# Patient Record
Sex: MALE | Race: WHITE | Employment: OTHER | URBAN - METROPOLITAN AREA
[De-identification: names, ages, dates, MRNs, and addresses within clinical notes are randomized per-mention and may not be internally consistent; named-entity substitution may affect disease eponyms.]

---

## 2017-01-03 ENCOUNTER — TELEPHONE (OUTPATIENT)
Dept: NURSING | Facility: CLINIC | Age: 37
End: 2017-01-03

## 2017-01-03 NOTE — TELEPHONE ENCOUNTER
"Call Type: Triage Call    Presenting Problem: \"I have blood in my stool.\" Pt. says that  yesterday, he had blood in his stool 1 time. Today, pt. has had  blood in his stool 5 times. Pt. says that when he wiped, after the  BM today, the toilet paper was saturated with the blood. Pt. says  that his stools are pasty and light brown-colored.  Triage Note:  Guideline Title: Gastrointestinal Bleeding ; Gastrointestinal Bleeding  Recommended Disposition: See Provider within 4 hours  Original Inclination: Wanted to speak with a nurse  Override Disposition: See ED Immediately  Intended Action: Go to Hospital / ED  Physician Contacted: No  One or more episodes of rectal bleeding (more than scant) and no symptoms of  hypovolemia ?  YES  New or worsening signs and symptoms that may indicate shock ? NO  Vomited blood after nosebleed ? NO  Receiving or recently completed chemotherapy or radiation therapy AND more than  one episode of black or tarry stool, not blood streaked ? NO  Rectal foreign body with any amount of bright red bleeding ? NO  Following ingestion of toxic or caustic substance ? NO  Passing red, black or tarry material from rectum AND onset of new signs and  symptoms of hypovolemia ? NO  Unbearable abdominal/pelvic pain ? NO  Vomiting red, bloody or coffee-ground material, more than streaks of blood or  scant amount (not following nosebleed within past day) ? NO  Chest discomfort associated with shortness of breath, sweating, odd heartbeats or  different heart rate, nausea, vomiting, lightheadedness, or fainting lasting 5 or  more minutes now or within the last hour ? NO  Chest pain spreading to the shoulders, neck, jaw, in one or both arms, stomach or  back lasting 5 or more minutes now or within the last hour. Pain is NOT  associated with taking a deep breath or a productive cough, movement, or touch to  a localized area. ? NO  Pressure, fullness, squeezing sensation or pain anywhere in the chest lasting 5 " or  more minutes now or within the last hour. Pain is NOT associated with taking a  deep breath or a productive cough, movement, or touch to a localized area on the  chest. ? NO  Bloody diarrhea AND has not been evaluated ? NO  Bleeding began after any gastrointestinal (GI) surgery or procedure and is lasting  longer than defined in provider specified discharge information ? NO  History of esophageal varices AND more than one episode of black or tarry stool ?  NO  Eight hours or less following abdominal or rectal injury AND having any amount  bright red rectal bleeding ? NO  Physician Instructions:  Care Advice: Call  if signs and symptoms of shock develop (such as  unable to stand due to faintness, dizziness, or lightheadedness  new onset of confusion  slow to respond or difficult to awaken  skin is pale, gray, cool, or moist to touch  severe weakness  loss of consciousness).  IMMEDIATE ACTION  CAUTIONS  Call provider immediately if symptoms of hypovolemia develop including:  pulse more than 100/minute, lightheaded or dizzy when rising from  sitting/reclining position, shortness of breath, weakness with exertion,  angina, or paleness.  List, or take, all current prescription(s), nonprescription or alternative  medication(s) to provider for evaluation.  May have clear liquids (such as water, clear fruit juices without pulp,  soda, tea or coffee without dairy or non-dairy creamer, clear broth or  bouillon, oral hydration solution, or plain gelatin, fruit ices/popsicles,  hard candy) but do not eat solid foods before talking with your provider.

## 2017-03-13 DIAGNOSIS — Z20.828 EXPOSURE TO INFLUENZA: Primary | ICD-10-CM

## 2017-03-13 RX ORDER — OSELTAMIVIR PHOSPHATE 75 MG/1
75 CAPSULE ORAL DAILY
Qty: 10 CAPSULE | Refills: 0 | Status: SHIPPED | OUTPATIENT
Start: 2017-03-13 | End: 2017-03-23

## 2017-03-24 DIAGNOSIS — F51.01 PRIMARY INSOMNIA: ICD-10-CM

## 2017-03-24 RX ORDER — ZOLPIDEM TARTRATE 10 MG/1
TABLET ORAL
Qty: 30 TABLET | Refills: 5 | Status: SHIPPED | OUTPATIENT
Start: 2017-03-24 | End: 2017-08-11

## 2017-03-24 NOTE — TELEPHONE ENCOUNTER
Zolpidem      Last Written Prescription Date:  11/27/16  Last Fill Quantity: 30,   # refills: 2  Last Office Visit with [facility], [facility] or [facility] prescribing provider: 05/16/16 Dr. Gutierrez    Future Office visit:       Routing refill request to provider for review/approval because:  Drug not on the [facility], [facility] or [facility] refill protocol or controlled substance    FABIAN Johnson)

## 2017-04-05 ENCOUNTER — TELEPHONE (OUTPATIENT)
Dept: FAMILY MEDICINE | Facility: CLINIC | Age: 37
End: 2017-04-05

## 2017-04-05 DIAGNOSIS — F51.01 PRIMARY INSOMNIA: ICD-10-CM

## 2017-04-05 RX ORDER — ZOLPIDEM TARTRATE 10 MG/1
TABLET ORAL
Qty: 30 TABLET | Refills: 5 | Status: CANCELLED | OUTPATIENT
Start: 2017-04-05

## 2017-04-05 NOTE — TELEPHONE ENCOUNTER
Reason for Call:  Medication or medication refill:    Do you use a [facility]?  Name of the pharmacy and phone number for the current request:  No ,  [facility] [address] [place] [facility], [place] - [address]    Name of the medication requested: zolpidem (AMBIEN) 10 MG tablet    Other request: Insurance only authorizes 15 tablets per 25 days , needs prior auth for 1 tablet per day. Pharmacy to fax over Prior Auth form.    Can we leave a detailed message on this number? YES    Phone number patient can be reached at: Other phone number:  [phone]  [facility]  Pharmacy     Best Time: Any    Call taken on 4/5/2017 at 4:09 PM by Bernie Hannah

## 2017-04-14 NOTE — TELEPHONE ENCOUNTER
Received PA form requesting more information - placed on Dr. Gutierrez's desk for review    Will Felisa HEARN

## 2017-04-21 DIAGNOSIS — F51.01 PRIMARY INSOMNIA: ICD-10-CM

## 2017-04-21 NOTE — TELEPHONE ENCOUNTER
traZODone (DESYREL) 100 MG tablet  Last Written Prescription Date: 5/16/16  Last Fill Quantity: 90; # refills: 3  Last Office Visit with [facility], [facility] or [facility] prescribing provider:  5/16/16 Dr. Gutierrez          Last PHQ-9 score on record=   PHQ-9 SCORE 1/21/2013   Total Score 5       Lab Results   Component Value Date    AST 28 04/27/2015     Lab Results   Component Value Date    ALT 30 04/27/2015

## 2017-04-25 RX ORDER — TRAZODONE HYDROCHLORIDE 100 MG/1
100 TABLET ORAL
Qty: 30 TABLET | Refills: 0 | Status: SHIPPED | OUTPATIENT
Start: 2017-04-25 | End: 2017-06-14

## 2017-06-14 DIAGNOSIS — F51.01 PRIMARY INSOMNIA: ICD-10-CM

## 2017-06-14 RX ORDER — TRAZODONE HYDROCHLORIDE 100 MG/1
100 TABLET ORAL
Qty: 30 TABLET | Refills: 0 | Status: SHIPPED | OUTPATIENT
Start: 2017-06-14 | End: 2017-08-11

## 2017-06-14 NOTE — TELEPHONE ENCOUNTER
traZODone (DESYREL) 100 MG tablet       Last Written Prescription Date: 4/5/17  Last Fill Quantity: 30; # refills: 0  Last Office Visit with [facility], [facility] or [facility] prescribing provider:  5/16/16 Dr. Gutierrez          Last PHQ-9 score on record=   PHQ-9 SCORE 1/21/2013   Total Score 5       Lab Results   Component Value Date    AST 28 04/27/2015     Lab Results   Component Value Date    ALT 30 04/27/2015

## 2017-06-14 NOTE — TELEPHONE ENCOUNTER
Routing refill request to provider for review/approval because:  PHQ-9 over 4  Kay Tomlinson RN

## 2017-07-13 DIAGNOSIS — F51.01 PRIMARY INSOMNIA: ICD-10-CM

## 2017-07-13 NOTE — TELEPHONE ENCOUNTER
traZODone (DESYREL) 100 MG tablet       Last Written Prescription Date: 6/14/17  Last Fill Quantity: 30; # refills: 0  Last Office Visit with [facility], [facility] or [facility] prescribing provider:  5/16/17        Last PHQ-9 score on record=   PHQ-9 SCORE 1/21/2013   Total Score 5       Lab Results   Component Value Date    AST 28 04/27/2015     Lab Results   Component Value Date    ALT 30 04/27/2015

## 2017-07-14 RX ORDER — TRAZODONE HYDROCHLORIDE 100 MG/1
100 TABLET ORAL
Qty: 30 TABLET | Refills: 0 | OUTPATIENT
Start: 2017-07-14

## 2017-07-14 NOTE — TELEPHONE ENCOUNTER
Routing refill request to provider for review/approval because:  Aparna given x1 and patient did not follow up, please advise    CARLOS Ugarte, Clinical RN Renetta Molina.

## 2017-08-08 DIAGNOSIS — F51.01 PRIMARY INSOMNIA: ICD-10-CM

## 2017-08-08 RX ORDER — TRAZODONE HYDROCHLORIDE 100 MG/1
100 TABLET ORAL
Qty: 30 TABLET | Refills: 0 | Status: CANCELLED | OUTPATIENT
Start: 2017-08-08

## 2017-08-08 NOTE — TELEPHONE ENCOUNTER
traZODone (DESYREL) 100 MG tablet       Last Written Prescription Date: 06/14/17  Last Fill Quantity: 30; # refills: 0  Last Office Visit with [facility], [facility] or [facility] prescribing provider:  05/16/16 Dr. Gutierrez        Last PHQ-9 score on record=   PHQ-9 SCORE 1/21/2013   Total Score 5       Lab Results   Component Value Date    AST 28 04/27/2015     Lab Results   Component Value Date    ALT 30 04/27/2015

## 2017-08-11 ENCOUNTER — OFFICE VISIT (OUTPATIENT)
Dept: FAMILY MEDICINE | Facility: CLINIC | Age: 37
End: 2017-08-11
Payer: COMMERCIAL

## 2017-08-11 VITALS
HEART RATE: 80 BPM | SYSTOLIC BLOOD PRESSURE: 127 MMHG | OXYGEN SATURATION: 96 % | DIASTOLIC BLOOD PRESSURE: 88 MMHG | BODY MASS INDEX: 29.7 KG/M2 | TEMPERATURE: 98.7 F | WEIGHT: 207 LBS

## 2017-08-11 DIAGNOSIS — F43.23 ADJUSTMENT DISORDER WITH MIXED ANXIETY AND DEPRESSED MOOD: ICD-10-CM

## 2017-08-11 DIAGNOSIS — R11.2 NAUSEA AND VOMITING, INTRACTABILITY OF VOMITING NOT SPECIFIED, UNSPECIFIED VOMITING TYPE: ICD-10-CM

## 2017-08-11 DIAGNOSIS — F51.01 PRIMARY INSOMNIA: Primary | ICD-10-CM

## 2017-08-11 PROCEDURE — 99213 OFFICE O/P EST LOW 20 MIN: CPT | Performed by: PHYSICIAN ASSISTANT

## 2017-08-11 RX ORDER — ONDANSETRON 4 MG/1
4 TABLET, FILM COATED ORAL EVERY 8 HOURS PRN
Qty: 15 TABLET | Refills: 0 | Status: SHIPPED | OUTPATIENT
Start: 2017-08-11 | End: 2018-02-19

## 2017-08-11 RX ORDER — TRAZODONE HYDROCHLORIDE 100 MG/1
100 TABLET ORAL
Qty: 30 TABLET | Refills: 1 | Status: SHIPPED | OUTPATIENT
Start: 2017-08-11 | End: 2018-02-19

## 2017-08-11 RX ORDER — ZOLPIDEM TARTRATE 10 MG/1
TABLET ORAL
Qty: 30 TABLET | Refills: 3 | Status: SHIPPED | OUTPATIENT
Start: 2017-08-11 | End: 2017-12-18

## 2017-08-11 NOTE — PATIENT INSTRUCTIONS
Based on your medical history and these are the current health maintenance or preventive care services that you are due for (some may have been done at this visit)  Health Maintenance Due   Topic Date Due     TETANUS IMMUNIZATION (SYSTEM ASSIGNED)  09/04/2006     LIPID SCREEN Q5 YR MALE (SYSTEM ASSIGNED)  10/22/2016       At [facility], we strive to deliver an exceptional experience to you, every time we see you.    If you receive a survey in the mail, please send us back your thoughts. We really do value your feedback.    Your care team's suggested websites for health information:  Www.Sustainatopia.com.org : Up to date and easily searchable information on multiple topics.  Www.medlineplus.gov : medication info, interactive tutorials, watch real surgeries online  Www.familydoctor.org : good info from the Academy of Family Physicians  Www.cdc.gov : public health info, travel advisories, epidemics (H1N1)  Www.aap.org : children's health info, normal development, vaccinations  Www.health.[facility].[place].us : [place] dept of health, public health issues in [place], N1N1    How to contact your care team:   Urgent Care/Same Day [phone]         Pharmacy [phone]      Clinic hours  M-Th 7 am-7 pm   Fri 7 am-5 pm.   Urgent care M-F 11 am-9 pm,   Sat/Sun 9 am-5 pm.  Pharmacy M-Th 8 am-8 pm Fri 8 am-6 pm  Sat/Sun 9 am-5 pm.     All password changes, disabled accounts, or ID changes in [employer]/MyHealth will be done by our Access Services Department.    If you need help with your account or password, call: [phone]. Clinic staff no longer has the ability to change passwords.     Adjustment Disorder  Life changes -- work, family, parents, children -- each can cause a great deal of stress in life. An adjustment disorder means you have trouble dealing with this change and stress. This problem can have serious results. You may feel helpless, depressed, make bad decisions, or even feel like you want to hurt  yourself.  Adjustment disorder can cause anxiety or depression. It is triggered by daily stresses such as:    Death of a loved one    Divorce    Marriage    General life changes such as changing or leaving a job    Moving    Illness or other health issue for you or a family  member    Sex    Money     Symptoms may include:    Sadness, crying    Anxiety    Insomnia    Poor concentration    Trouble doing simple things    New problems at work or with family or friends    Loss of self-esteem    Sense of hopelessness    Feeling trapped or cut off from others  With this condition, it is common to feel sad, guilty, hopeless and restless. These feelings may continue for weeks or months. It can be helpful to identify what is causing the additional stress and take steps to get extra support. If new stressful events do not occur, it is likely that you will gradually start feeling better.  Home care    If you have been given a prescription for medicine, take it as directed.    It helps to talk about your feelings and thoughts with family or friends that understand and support you.  Follow-up care  Follow up with your healthcare provider, or therapist as advised. Let them know if this condition does not improve or gets worse.  When to seek medical advice  Call your healthcare provider right away if any of these occur:    Worsening depression or anxiety    Feeling out of control    Thoughts of harming yourself or another    Being unable to care for yourself  Date Last Reviewed: 9/29/2015 2000-2017 [employer]. [address] [place] [address], [place] [address]. All rights reserved. This information is not intended as a substitute for professional medical care. Always follow your healthcare professional's instructions.

## 2017-08-11 NOTE — LETTER
[address] [address]  Phone: [phone]    August 11, 2017        Francis Sommers  [address] [facility] [address]      If I am having increasing thoughts of hurting myself or feeling increasingly unsafe, I will contact a family member, [facility]  or the Suicide Prevention Hotline .             RUKHSANA Fitzgerald

## 2017-08-11 NOTE — NURSING NOTE
"Chief Complaint   Patient presents with     Depression     Pt states that he started feeling this way 3 days ago because of stress. Pt had depression in the past and was on medication       Initial /88 (BP Location: Left arm, Patient Position: Chair, Cuff Size: Adult Regular)  Pulse 80  Temp 98.7  F (37.1  C) (Oral)  Wt 207 lb (93.9 kg)  SpO2 96%  BMI 29.7 kg/m2 Estimated body mass index is 29.7 kg/(m^2) as calculated from the following:    Height as of 5/16/16: 5' 10\" (1.778 m).    Weight as of this encounter: 207 lb (93.9 kg).  Medication Reconciliation: complete   Elizabeth Chapa MA        "

## 2017-08-11 NOTE — MR AVS SNAPSHOT
After Visit Summary   8/11/2017    Francis Sommers    MRN: [id number]           Patient Information     Date Of Birth          1980        Visit Information        Provider Department      8/11/2017 10:20 AM Nick Cooney PA [facility]        Today's Diagnoses     Primary insomnia    -  1    Adjustment disorder with mixed anxiety and depressed mood        Nausea and vomiting, intractability of vomiting not specified, unspecified vomiting type          Care Instructions    Based on your medical history and these are the current health maintenance or preventive care services that you are due for (some may have been done at this visit)  Health Maintenance Due   Topic Date Due     TETANUS IMMUNIZATION (SYSTEM ASSIGNED)  09/04/2006     LIPID SCREEN Q5 YR MALE (SYSTEM ASSIGNED)  10/22/2016       At [facility], we strive to deliver an exceptional experience to you, every time we see you.    If you receive a survey in the mail, please send us back your thoughts. We really do value your feedback.    Your care team's suggested websites for health information:  Www.Futurederm.org : Up to date and easily searchable information on multiple topics.  Www.medlineplus.gov : medication info, interactive tutorials, watch real surgeries online  Www.familydoctor.org : good info from the Academy of Family Physicians  Www.cdc.gov : public health info, travel advisories, epidemics (H1N1)  Www.aap.org : children's health info, normal development, vaccinations  Www.health.state.[place].us : [place] dept of health, public health issues in [place], N1N1    How to contact your care team:   Urgent Care/Same Day [phone]         Pharmacy [phone]      Clinic hours  M-Th 7 am-7 pm   Fri 7 am-5 pm.   Urgent care M-F 11 am-9 pm,   Sat/Sun 9 am-5 pm.  Pharmacy M-Th 8 am-8 pm Fri 8 am-6 pm  Sat/Sun 9 am-5 pm.     All password changes, disabled accounts, or ID changes in  [employer]/MyHealth will be done by our Access Services Department.    If you need help with your account or password, call: [phone]. Clinic staff no longer has the ability to change passwords.     Adjustment Disorder  Life changes -- work, family, parents, children -- each can cause a great deal of stress in life. An adjustment disorder means you have trouble dealing with this change and stress. This problem can have serious results. You may feel helpless, depressed, make bad decisions, or even feel like you want to hurt yourself.  Adjustment disorder can cause anxiety or depression. It is triggered by daily stresses such as:    Death of a loved one    Divorce    Marriage    General life changes such as changing or leaving a job    Moving    Illness or other health issue for you or a family  member    Sex    Money     Symptoms may include:    Sadness, crying    Anxiety    Insomnia    Poor concentration    Trouble doing simple things    New problems at work or with family or friends    Loss of self-esteem    Sense of hopelessness    Feeling trapped or cut off from others  With this condition, it is common to feel sad, guilty, hopeless and restless. These feelings may continue for weeks or months. It can be helpful to identify what is causing the additional stress and take steps to get extra support. If new stressful events do not occur, it is likely that you will gradually start feeling better.  Home care    If you have been given a prescription for medicine, take it as directed.    It helps to talk about your feelings and thoughts with family or friends that understand and support you.  Follow-up care  Follow up with your healthcare provider, or therapist as advised. Let them know if this condition does not improve or gets worse.  When to seek medical advice  Call your healthcare provider right away if any of these occur:    Worsening depression or anxiety    Feeling out of control    Thoughts of harming  yourself or another    Being unable to care for yourself  Date Last Reviewed: 9/29/2015 2000-2017 [employer]. [address] [place] [address], [place] [address]. All rights reserved. This information is not intended as a substitute for professional medical care. Always follow your healthcare professional's instructions.                Follow-ups after your visit        Additional Services     MENTAL HEALTH REFERRAL       Your provider has referred you to: [facility]: [facility] - Counseling (Individual/Couples/Family) - [facility] [phone]   [email or web address]/   *Patient will be contacted by [facility] scheduling partner, [facility]), to schedule an appointment.  Patients may also call [facility] to schedule.    All scheduling is subject to the client's specific insurance plan & benefits, provider/location availability, and provider clinical specialities.  Please arrive 15 minutes early for your first appointment and bring your completed paperwork.    Please be aware that coverage of these services is subject to the terms and limitations of your health insurance plan.  Call member services at your health plan with any benefit or coverage questions.                  Follow-up notes from your care team     Return if symptoms worsen or fail to improve.      Who to contact     If you have questions or need follow up information about today's clinic visit or your schedule please contact [facility] directly at [phone].  Normal or non-critical lab and imaging results will be communicated to you by MyChart, letter or phone within 4 business days after the clinic has received the results. If you do not hear from us within 7 days, please contact the clinic through MyChart or phone. If you have a critical or abnormal lab result, we will notify you by phone as soon as possible.  Submit  "refill requests through [employer] or call your pharmacy and they will forward the refill request to us. Please allow 3 business days for your refill to be completed.          Additional Information About Your Visit        [employer] Information     [employer] lets you send messages to your doctor, view your test results, renew your prescriptions, schedule appointments and more. To sign up, go to [email or web address] . Click on \"Log in\" on the left side of the screen, which will take you to the Welcome page. Then click on \"Sign up Now\" on the right side of the page.     You will be asked to enter the access code listed below, as well as some personal information. Please follow the directions to create your username and password.     Your access code is: [email or web address]  Expires: 2017 10:55 AM     Your access code will  in 90 days. If you need help or a new code, please call your [place] clinic or [phone].        Care EveryWhere ID     This is your Care EveryWhere ID. This could be used by other organizations to access your [place] medical records  [id number]        Your Vitals Were     Pulse Temperature Pulse Oximetry BMI (Body Mass Index)          80 98.7  F (37.1  C) (Oral) 96% 29.7 kg/m2         Blood Pressure from Last 3 Encounters:   17 127/88   16 108/74   05/29/15 111/77    Weight from Last 3 Encounters:   17 207 lb (93.9 kg)   16 187 lb 14.4 oz (85.2 kg)   05/29/15 194 lb 14.4 oz (88.4 kg)              We Performed the Following     MENTAL HEALTH REFERRAL          Today's Medication Changes          These changes are accurate as of: 17 10:55 AM.  If you have any questions, ask your nurse or doctor.               Start taking these medicines.        Dose/Directions    FLUoxetine 20 MG capsule   Commonly known as:  PROzac   Used for:  Adjustment disorder with mixed anxiety and depressed mood   Started by:  Nick Cooney PA        Dose:  20 mg   Take 1 " capsule (20 mg) by mouth daily   Quantity:  30 capsule   Refills:  2       ondansetron 4 MG tablet   Commonly known as:  ZOFRAN   Used for:  Nausea and vomiting, intractability of vomiting not specified, unspecified vomiting type   Started by:  Nick Cooney PA        Dose:  4 mg   Take 1 tablet (4 mg) by mouth every 8 hours as needed for nausea   Quantity:  15 tablet   Refills:  0            Where to get your medicines      These medications were sent [place] [address] [place] [facility] [place] - [address] [facility]  [address] [facility] [address] [facility] [address]     Phone:  [phone]     FLUoxetine 20 MG capsule    ondansetron 4 MG tablet    traZODone 100 MG tablet         Some of these will need a paper prescription and others can be bought over the counter.  Ask your nurse if you have questions.     Bring a paper prescription for each of these medications     zolpidem 10 MG tablet                Primary Care Provider Office Phone # Fax #    Candy Silvano Gutierrez [phone] [address] [facility] [address]        Equal Access to Services     [facility]: Hadii aad ku hadasho Soomaali, waaxda luqadaha, qaybta kaalmada adeegyada, waxay fred hoskins . So [facility] [place] [phone].    ATENCIÓN: Si habla español, tiene a schaefer disposición servicios gratuitos de asistencia lingüística. Llame al [phone].    We comply with applicable federal civil rights laws and [place] laws. We do not discriminate on the basis of race, color, national origin, age, disability sex, sexual orientation or gender identity.            Thank you!     Thank you for choosing [facility]  for your care. Our goal is always to provide you with excellent care. Hearing back from our patients is one way we can continue to improve our services. Please take a few minutes to complete the written survey that you may receive in the mail after your visit with us. Thank you!              Your Updated Medication List - Protect others around you: Learn how to safely use, store and throw away your medicines at www.disposemymeds.org.          This list is accurate as of: 8/11/17 10:55 AM.  Always use your most recent med list.                   Brand Name Dispense Instructions for use Diagnosis    FLUoxetine 20 MG capsule    PROzac    30 capsule    Take 1 capsule (20 mg) by mouth daily    Adjustment disorder with mixed anxiety and depressed mood       ondansetron 4 MG tablet    ZOFRAN    15 tablet    Take 1 tablet (4 mg) by mouth every 8 hours as needed for nausea    Nausea and vomiting, intractability of vomiting not specified, unspecified vomiting type       traZODone 100 MG tablet    DESYREL    30 tablet    Take 1 tablet (100 mg) by mouth nightly as needed    Primary insomnia       zolpidem 10 MG tablet    AMBIEN    30 tablet    TAKE 1 TABLET BY MOUTH NIGHTLY AS NEEDED    Primary insomnia

## 2017-08-11 NOTE — PROGRESS NOTES
SUBJECTIVE:                                                    Francis Sommers is a 36 year old male who presents to clinic today for the following health issues:      Abnormal Mood Symptoms  Onset: 3 days     Description:   Depression: YES  Anxiety: YES    Accompanying Signs & Symptoms:  Still participating in activities that you used to enjoy: no  Fatigue: YES  Irritability: YES  Difficulty concentrating: YES  Changes in appetite: YES  Problems with sleep: YES  Heart racing/beating fast : YES  Thoughts of hurting yourself or others: has been having thoughts to the effect that if her were dead, there would be no stress, but he has no plan, states he doesn't actually want to die, has been reaching out to people, feels safe at home, and clearly wants help.  Family is present during visit and is supportive and able to stay with patient.  There are no firearms at home.      History:   Recent stress: YES, acute housing issues  Prior depression hospitalization: None  Family history of depression: YES  Family history of anxiety: YES    Precipitating factors:   Alcohol/drug use: no    Alleviating factors:  none    Therapies Tried and outcome: None     Hx insomnia-has been off the trazodone for a couple weeks. Almost out of ambien.    Has had similar episodes in the past, after dadas death and during the holidays    Has been in grief counseling and been on prozac in the past.      Has been feeling nausea and some vomting, is keeping down fluids, no diarrhea              No Known Allergies    Past Medical History:   Diagnosis Date     Insomnia 2/15/2011         Current Outpatient Prescriptions on File Prior to Visit:  [DISCONTINUED] traZODone (DESYREL) 100 MG tablet Take 1 tablet (100 mg) by mouth nightly as needed     No current facility-administered medications on file prior to visit.     Social History   Substance Use Topics     Smoking status: Never Smoker     Smokeless tobacco: Never Used     Alcohol use No        ROS:   GEN no fever  PSYCH depression as above  ABD n/v as above    OBJECTIVE:  /88 (BP Location: Left arm, Patient Position: Chair, Cuff Size: Adult Regular)  Pulse 80  Temp 98.7  F (37.1  C) (Oral)  Wt 207 lb (93.9 kg)  SpO2 96%  BMI 29.7 kg/m2   General:   awake, alert, and cooperative.  NAD.   Head: Normocephalic, atraumatic.  Eyes: Conjunctiva clear,    Neuro: Alert and oriented - normal speech.  PSYCH; teary, good insight, no tangential speech    ASSESSMENT: Patient clearly looking for support and has family members available to him.  We completed a safety contract and both of us signed it. He has been through episodes like this in the past.      ICD-10-CM    1. Primary insomnia F51.01 traZODone (DESYREL) 100 MG tablet     zolpidem (AMBIEN) 10 MG tablet   2. Adjustment disorder with mixed anxiety and depressed mood F43.23 FLUoxetine (PROZAC) 20 MG capsule     MENTAL HEALTH REFERRAL   3. Nausea and vomiting, intractability of vomiting not specified, unspecified vomiting type R11.2 ondansetron (ZOFRAN) 4 MG tablet       PLAN:   Follow up:  As above and prn  Advised about symptoms which might herald more serious problems.

## 2017-08-12 ENCOUNTER — HOSPITAL ENCOUNTER (EMERGENCY)
Facility: CLINIC | Age: 37
Discharge: HOME | End: 2017-08-12
Attending: EMERGENCY MEDICINE | Admitting: EMERGENCY MEDICINE
Payer: COMMERCIAL

## 2017-08-12 VITALS
DIASTOLIC BLOOD PRESSURE: 70 MMHG | OXYGEN SATURATION: 100 % | RESPIRATION RATE: 16 BRPM | SYSTOLIC BLOOD PRESSURE: 118 MMHG | TEMPERATURE: 98.6 F | HEART RATE: 79 BPM

## 2017-08-12 DIAGNOSIS — R10.84 ABDOMINAL PAIN, GENERALIZED: ICD-10-CM

## 2017-08-12 DIAGNOSIS — K59.00 CONSTIPATION, UNSPECIFIED CONSTIPATION TYPE: ICD-10-CM

## 2017-08-12 DIAGNOSIS — F41.0 PANIC ATTACK: ICD-10-CM

## 2017-08-12 LAB
ALBUMIN SERPL-MCNC: 4.3 G/DL (ref 3.4–5)
ALBUMIN UR-MCNC: 10 MG/DL
ALP SERPL-CCNC: 55 U/L (ref 40–150)
ALT SERPL W P-5'-P-CCNC: 30 U/L (ref 0–70)
ANION GAP SERPL CALCULATED.3IONS-SCNC: 7 MMOL/L (ref 3–14)
APPEARANCE UR: CLEAR
AST SERPL W P-5'-P-CCNC: 19 U/L (ref 0–45)
BASOPHILS # BLD AUTO: 0 10E9/L (ref 0–0.2)
BASOPHILS NFR BLD AUTO: 0.1 %
BILIRUB SERPL-MCNC: 0.6 MG/DL (ref 0.2–1.3)
BILIRUB UR QL STRIP: NEGATIVE
BUN SERPL-MCNC: 19 MG/DL (ref 7–30)
CALCIUM SERPL-MCNC: 9.3 MG/DL (ref 8.5–10.1)
CHLORIDE SERPL-SCNC: 100 MMOL/L (ref 94–109)
CO2 SERPL-SCNC: 28 MMOL/L (ref 20–32)
COLOR UR AUTO: YELLOW
CREAT SERPL-MCNC: 1.09 MG/DL (ref 0.66–1.25)
DIFFERENTIAL METHOD BLD: ABNORMAL
EOSINOPHIL # BLD AUTO: 0 10E9/L (ref 0–0.7)
EOSINOPHIL NFR BLD AUTO: 0.1 %
ERYTHROCYTE [DISTWIDTH] IN BLOOD BY AUTOMATED COUNT: 11.8 % (ref 10–15)
GFR SERPL CREATININE-BSD FRML MDRD: 77 ML/MIN/1.7M2
GLUCOSE SERPL-MCNC: 154 MG/DL (ref 70–99)
GLUCOSE UR STRIP-MCNC: NEGATIVE MG/DL
HCT VFR BLD AUTO: 45.2 % (ref 40–53)
HGB BLD-MCNC: 16.2 G/DL (ref 13.3–17.7)
HGB UR QL STRIP: NEGATIVE
IMM GRANULOCYTES # BLD: 0 10E9/L (ref 0–0.4)
IMM GRANULOCYTES NFR BLD: 0.2 %
KETONES UR STRIP-MCNC: 10 MG/DL
LEUKOCYTE ESTERASE UR QL STRIP: ABNORMAL
LYMPHOCYTES # BLD AUTO: 0.7 10E9/L (ref 0.8–5.3)
LYMPHOCYTES NFR BLD AUTO: 4 %
MCH RBC QN AUTO: 29.6 PG (ref 26.5–33)
MCHC RBC AUTO-ENTMCNC: 35.8 G/DL (ref 31.5–36.5)
MCV RBC AUTO: 83 FL (ref 78–100)
MONOCYTES # BLD AUTO: 1.2 10E9/L (ref 0–1.3)
MONOCYTES NFR BLD AUTO: 7.1 %
MUCOUS THREADS #/AREA URNS LPF: PRESENT /LPF
NEUTROPHILS # BLD AUTO: 14.6 10E9/L (ref 1.6–8.3)
NEUTROPHILS NFR BLD AUTO: 88.5 %
NITRATE UR QL: NEGATIVE
PH UR STRIP: 5.5 PH (ref 5–7)
PLATELET # BLD AUTO: 236 10E9/L (ref 150–450)
POTASSIUM SERPL-SCNC: 3.7 MMOL/L (ref 3.4–5.3)
PROT SERPL-MCNC: 7.9 G/DL (ref 6.8–8.8)
RBC # BLD AUTO: 5.48 10E12/L (ref 4.4–5.9)
RBC #/AREA URNS AUTO: <1 /HPF (ref 0–2)
SODIUM SERPL-SCNC: 135 MMOL/L (ref 133–144)
SP GR UR STRIP: 1.02 (ref 1–1.03)
URN SPEC COLLECT METH UR: ABNORMAL
UROBILINOGEN UR STRIP-MCNC: NORMAL MG/DL (ref 0–2)
WBC # BLD AUTO: 16.5 10E9/L (ref 4–11)
WBC #/AREA URNS AUTO: 3 /HPF (ref 0–2)

## 2017-08-12 PROCEDURE — 25000132 ZZH RX MED GY IP 250 OP 250 PS 637: Performed by: EMERGENCY MEDICINE

## 2017-08-12 PROCEDURE — 85025 COMPLETE CBC W/AUTO DIFF WBC: CPT | Performed by: EMERGENCY MEDICINE

## 2017-08-12 PROCEDURE — 99284 EMERGENCY DEPT VISIT MOD MDM: CPT | Performed by: EMERGENCY MEDICINE

## 2017-08-12 PROCEDURE — 81001 URINALYSIS AUTO W/SCOPE: CPT | Performed by: EMERGENCY MEDICINE

## 2017-08-12 PROCEDURE — 80053 COMPREHEN METABOLIC PANEL: CPT | Performed by: EMERGENCY MEDICINE

## 2017-08-12 PROCEDURE — 99283 EMERGENCY DEPT VISIT LOW MDM: CPT

## 2017-08-12 RX ORDER — LORAZEPAM 1 MG/1
1 TABLET ORAL ONCE
Status: COMPLETED | OUTPATIENT
Start: 2017-08-12 | End: 2017-08-12

## 2017-08-12 RX ADMIN — LORAZEPAM 1 MG: 1 TABLET ORAL at 20:01

## 2017-08-12 NOTE — ED AVS SNAPSHOT
[facility] Department    [address] [place] [address]    Phone:  [phone]    Fax:  [phone]                                       Francis Sommers   MRN: [id number]    Department:  [facility] Emergency Department   Date of Visit:  8/12/2017           After Visit Summary Signature Page     I have received my discharge instructions, and my questions have been answered. I have discussed any challenges I see with this plan with the nurse or doctor.    ..........................................................................................................................................  Patient/Patient Representative Signature      ..........................................................................................................................................  Patient Representative Print Name and Relationship to Patient    ..................................................               ................................................  Date                                            Time    ..........................................................................................................................................  Reviewed by Signature/Title    ...................................................              ..............................................  Date                                                            Time

## 2017-08-12 NOTE — ED AVS SNAPSHOT
[facility] Emergency Department    [address]    [facility] [address]    Phone:  [phone]    Fax:  [phone]                                       Francis Sommers   MRN: [id number]    Department:  [facility] Emergency Department   Date of Visit:  8/12/2017           Patient Information     Date Of Birth          1980        Your diagnoses for this visit were:     Abdominal pain, generalized     Constipation, unspecified constipation type     Panic attack        You were seen by Mars Frausto MD.      Follow-up Information     Follow up with Candy Gutierrez MD.    Specialty:  Family Practice    Contact information:    [address] [facility] [address]  [phone]          Discharge Instructions         Constipation (Adult)  Constipation means that you have bowel movements that are less frequent than usual. Stools often become very hard and difficult to pass.  Constipation is very common. At some point in life it affects almost everyone. Since everyone's bowel habits are different, what is constipation to one person may not be to another. Your healthcare provider may do tests to diagnose constipation. It depends on what he or she finds when evaluating you.    Symptoms of constipation include:    Abdominal pain    Bloating    Vomiting    Painful bowel movements    Itching, swelling, bleeding, or pain around the anus  Causes  Constipation can have many causes. These include:    Diet low in fiber    Too much dairy    Not drinking enough liquids    Lack of exercise or physical activity. This is especially true for older adults.    Changes in lifestyle or daily routine, including pregnancy, aging, work, and travel    Frequent use or misuse of laxatives    Ignoring the urge to have a bowel movement or delaying it until later    Medicines, such as certain prescription pain medicines, iron supplements, antacids, certain antidepressants, and calcium  supplements    Diseases like irritable bowel syndrome, bowel obstructions, stroke, diabetes, thyroid disease, Parkinson disease, hemorrhoids, and colon cancer  Complications  Potential complications of constipation can include:    Hemorrhoids    Rectal bleeding from hemorrhoids or anal fissures (skin tears)    Hernias    Dependency on laxatives    Chronic constipation    Fecal impaction    Bowel obstruction or perforation  Home care  All treatment should be done after talking with your healthcare provider. This is especially true if you have another medical problems, are taking prescription medicines, or are an older adult. Treatment most often involves lifestyle changes. You may also need medicines. Your healthcare provider will tell you which will work best for you. Follow the advice below to help avoid this problem in the future.  Lifestyle changes  These lifestyle changes can help prevent constipation:    Diet. Eat a high-fiber diet, with fresh fruit and vegetables, and reduce dairy intake, meats, and processed foods    Fluids. It's important to get enough fluids each day. Drink plenty of water when you eat more fiber. If you are on diet that limits the amount of fluid you can have, talk about this with your healthcare provider.    Regular exercise. Check with your healthcare provider first.  Medications  Take any medicines as directed. Some laxatives are safe to use only every now and then. Others can be taken on a regular basis. Talk with your doctor or pharmacist if you have questions.  Prescription pain medicines can cause constipation. If you are taking this kind of medicine, ask your healthcare provider if you should also take a stool softener.  Medicines you may take to treat constipation include:    Fiber supplements    Stool softeners    Laxatives    Enemas    Rectal suppositories  Follow-up care  Follow up with your healthcare provider if symptoms don't get better in the next few days. You may need to  have more tests or see a specialist.  Call 911  Call 911 if any of these occur:    Trouble breathing    Stiff, rigid abdomen that is severely painful to touch    Confusion    Fainting or loss of consciousness    Rapid heart rate    Chest pain  When to seek medical advice  Call your healthcare provider right away if any of these occur:    Fever over 100.4 F (38 C)    Failure to resume normal bowel movements    Pain in your abdomen or back gets worse    Nausea or vomiting    Swelling in your abdomen    Blood in the stool    Black, tarry stool    Involuntary weight loss    Weakness  Date Last Reviewed: 12/30/2015 2000-2017 [employer]. [address] [place] [address]. All rights reserved. This information is not intended as a substitute for professional medical care. Always follow your healthcare professional's instructions.      Continue current medications, follow up primary care    24 Hour Appointment Hotline       To make an appointment at any [facility] clinic, call [phone] ([phone]). If you don't have a family doctor or clinic, we will help you find one. [facility] clinics are conveniently located to serve the needs of you and your family.             Review of your medicines      Our records show that you are taking the medicines listed below. If these are incorrect, please call your family doctor or clinic.        Dose / Directions Last dose taken    FLUoxetine 20 MG capsule   Commonly known as:  PROzac   Dose:  20 mg   Quantity:  30 capsule        Take 1 capsule (20 mg) by mouth daily   Refills:  2        ondansetron 4 MG tablet   Commonly known as:  ZOFRAN   Dose:  4 mg   Quantity:  15 tablet        Take 1 tablet (4 mg) by mouth every 8 hours as needed for nausea   Refills:  0        traZODone 100 MG tablet   Commonly known as:  DESYREL   Dose:  100 mg   Quantity:  30 tablet        Take 1 tablet (100 mg) by mouth nightly as needed   Refills:  1        zolpidem 10 MG tablet    Commonly known as:  AMBIEN   Quantity:  30 tablet        TAKE 1 TABLET BY MOUTH NIGHTLY AS NEEDED   Refills:  3                Procedures and tests performed during your visit     CBC with platelets differential    Comprehensive metabolic panel    UA reflex to Microscopic      Orders Needing Specimen Collection     None      Pending Results     No orders found from 8/10/2017 to 8/13/2017.            Pending Culture Results     No orders found from 8/10/2017 to 8/13/2017.            Pending Results Instructions     If you had any lab results that were not finalized at the time of your Discharge, you can call the ED Lab Result RN at [phone]. You will be contacted by this team for any positive Lab results or changes in treatment. The nurses are available 7 days a week from 10A to 6:30P.  You can leave a message 24 hours per day and they will return your call.        Test Results From Your Hospital Stay        8/12/2017 10:07 PM      Component Results     Component Value Ref Range & Units Status    WBC 16.5 (H) 4.0 - 11.0 10e9/L Final    RBC Count 5.48 4.4 - 5.9 10e12/L Final    Hemoglobin 16.2 13.3 - 17.7 g/dL Final    Hematocrit 45.2 40.0 - 53.0 % Final    MCV 83 78 - 100 fl Final    MCH 29.6 26.5 - 33.0 pg Final    MCHC 35.8 31.5 - 36.5 g/dL Final    RDW 11.8 10.0 - 15.0 % Final    Platelet Count 236 150 - 450 10e9/L Final    Diff Method Automated Method  Final    % Neutrophils 88.5 % Final    % Lymphocytes 4.0 % Final    % Monocytes 7.1 % Final    % Eosinophils 0.1 % Final    % Basophils 0.1 % Final    % Immature Granulocytes 0.2 % Final    Absolute Neutrophil 14.6 (H) 1.6 - 8.3 10e9/L Final    Absolute Lymphocytes 0.7 (L) 0.8 - 5.3 10e9/L Final    Absolute Monocytes 1.2 0.0 - 1.3 10e9/L Final    Absolute Eosinophils 0.0 0.0 - 0.7 10e9/L Final    Absolute Basophils 0.0 0.0 - 0.2 10e9/L Final    Abs Immature Granulocytes 0.0 0 - 0.4 10e9/L Final         8/12/2017 10:17 PM      Component Results     Component  Value Ref Range & Units Status    Sodium 135 133 - 144 mmol/L Final    Potassium 3.7 3.4 - 5.3 mmol/L Final    Chloride 100 94 - 109 mmol/L Final    Carbon Dioxide 28 20 - 32 mmol/L Final    Anion Gap 7 3 - 14 mmol/L Final    Glucose 154 (H) 70 - 99 mg/dL Final    Urea Nitrogen 19 7 - 30 mg/dL Final    Creatinine 1.09 0.66 - 1.25 mg/dL Final    GFR Estimate 77 >60 mL/min/1.7m2 Final    Non  GFR Calc    GFR Estimate If Black >90   GFR Calc   >60 mL/min/1.7m2 Final    Calcium 9.3 8.5 - 10.1 mg/dL Final    Bilirubin Total 0.6 0.2 - 1.3 mg/dL Final    Albumin 4.3 3.4 - 5.0 g/dL Final    Protein Total 7.9 6.8 - 8.8 g/dL Final    Alkaline Phosphatase 55 40 - 150 U/L Final    ALT 30 0 - 70 U/L Final    AST 19 0 - 45 U/L Final         8/12/2017 10:29 PM      Component Results     Component Value Ref Range & Units Status    Color Urine Yellow  Final    Appearance Urine Clear  Final    Glucose Urine Negative NEG mg/dL Final    Bilirubin Urine Negative NEG Final    Ketones Urine 10 (A) NEG mg/dL Final    Specific Gravity Urine 1.020 1.003 - 1.035 Final    Blood Urine Negative NEG Final    pH Urine 5.5 5.0 - 7.0 pH Final    Protein Albumin Urine 10 (A) NEG mg/dL Final    Urobilinogen mg/dL Normal 0.0 - 2.0 mg/dL Final    Nitrite Urine Negative NEG Final    Leukocyte Esterase Urine Trace (A) NEG Final    Source Unspecified Urine  Final    RBC Urine <1 0 - 2 /HPF Final    WBC Urine 3 (H) 0 - 2 /HPF Final    Mucous Urine Present (A) NEG /LPF Final                Thank you for choosing [facility]       Thank you for choosing [facility] for your care. Our goal is always to provide you with excellent care. Hearing back from our patients is one way we can continue to improve our services. Please take a few minutes to complete the written survey that you may receive in the mail after you visit with us. Thank you!        [employer] Information     [employer] lets you send messages to your doctor, view your test  "results, renew your prescriptions, schedule appointments and more. To sign up, go to [email or web address] . Click on \"Log in\" on the left side of the screen, which will take you to the Welcome page. Then click on \"Sign up Now\" on the right side of the page.     You will be asked to enter the access code listed below, as well as some personal information. Please follow the directions to create your username and password.     Your access code is: [email or web address]  Expires: 2017 10:55 AM     Your access code will  in 90 days. If you need help or a new code, please call your [facility] clinic or [phone].        Care EveryWhere ID     This is your Care EveryWhere ID. This could be used by other organizations to access your [facility] medical records  [id number]        Equal Access to Services     CHARISSE PRATHER : Hadman Doe, geovani maldonado, helio dangeloaltomy sanchez, samreen hoskins . So [facility] [phone].    ATENCIÓN: Si habla español, tiene a schaefer disposición servicios gratuitos de asistencia lingüística. Llame al [phone].    We comply with applicable federal civil rights laws and [place] laws. We do not discriminate on the basis of race, color, national origin, age, disability sex, sexual orientation or gender identity.            After Visit Summary       This is your record. Keep this with you and show to your community pharmacist(s) and doctor(s) at your next visit.                  "

## 2017-08-13 ENCOUNTER — NURSE TRIAGE (OUTPATIENT)
Dept: NURSING | Facility: CLINIC | Age: 37
End: 2017-08-13

## 2017-08-13 NOTE — ED NOTES
"Further discussion with pt and wife reveals pt has had long history of depression sleep troupbles and anxiety he has taken trazodone daily for \"years\" but ran out a little over 2 weeks ago he has been struggling more with emotions sleep and stress  They are in process of buying/selling a home and \"other stressors\"  Per wife 4 days ago pt began just crying crawling around on floor and being unable to care for himself without her detailed assistance  She took him into the clinic was told \"deep depression\" advised to take his meds which were refilled and started yesterday    Pt reports he has not had a bm in 2 days and usualy goes several times a day    significant amount of time spent coaching pt to slow breathing and discussing how most of his unpleasant symptoms he is feeling are caused by his rapid breathing    Pt did sit on the toilet foe approx 10min and failed to have a BM - \"its just to painful\"    Assisted back to bed pt has slowed his resp rate   Continued to encourage pt to answer for himself keep his eyes open and communicate       "

## 2017-08-13 NOTE — ED NOTES
"Pt wife came to triage desk, said she couldn't get her  out of the car. When this writer got out to parking lot, pt was lying on the ground next to the car. Pt said several times, \"I have to poop.\" Pt attempted to have a bowel movement 15 minutes prior to arrival but said the pain only increased with futile pushing. Pt was seen by a doctor yesterday and was started on prozac for his anxiety and zofran. Pt was hyperventilating, says his whole body is tingling, pt encouraged to slow his breathing down. Pt required assistance of 2 to get into a wheelchair from out by his car, also to get from wheelchair to bed. In ER room 11.  "

## 2017-08-13 NOTE — ED PROVIDER NOTES
"  History     Chief Complaint   Patient presents with     Abdominal Pain     no BM for 2 days     HPI  Francis Sommers is a 36 year old male who presents with sudden onset lower abdominal cramping and pain, hyperventilation.  He has history of insomnia, and ran out of his trazodone 2 weeks ago, he slept very poorly over the past 2 weeks.  Stressors regarding trying to release their town home.  Seen yesterday in clinic trazodone restarted, also started on Zofran and Prozac.  Typically has 8-10 bowel movements daily, has had no bowel movement for the past 2 days.  No prior abdominal surgery.  Denies nausea vomiting or fever.  No inciting trauma or strain.  Denies dysuria, however describes frequent urination without gross hematuria.  Pain is like \"daggers\".  Nonradiating.    I have reviewed the Medications, Allergies, Past Medical and Surgical History, and Social History in the Epic system.    Allergies: No Known Allergies      No current facility-administered medications on file prior to encounter.   Current Outpatient Prescriptions on File Prior to Encounter:  traZODone (DESYREL) 100 MG tablet Take 1 tablet (100 mg) by mouth nightly as needed   zolpidem (AMBIEN) 10 MG tablet TAKE 1 TABLET BY MOUTH NIGHTLY AS NEEDED   FLUoxetine (PROZAC) 20 MG capsule Take 1 capsule (20 mg) by mouth daily   ondansetron (ZOFRAN) 4 MG tablet Take 1 tablet (4 mg) by mouth every 8 hours as needed for nausea       Patient Active Problem List   Diagnosis     CARDIOVASCULAR SCREENING; LDL GOAL LESS THAN 160     Primary insomnia       Past Surgical History:   Procedure Laterality Date     COLONOSCOPY N/A 5/20/2015    Procedure: COMBINED COLONOSCOPY, SINGLE OR MULTIPLE BIOPSY/POLYPECTOMY BY BIOPSY;  Surgeon: Duane, William Charles, MD;  Location:  OR     COLONOSCOPY WITH CO2 INSUFFLATION N/A 5/20/2015    Procedure: COLONOSCOPY WITH CO2 INSUFFLATION;  Surgeon: Duane, William Charles, MD;  Location:  OR     ENDOSCOPY UPPER, COLONOSCOPY, " "COMBINED N/A 5/20/2015    Procedure: COMBINED ENDOSCOPY UPPER, COLONOSCOPY;  Surgeon: Duane, William Charles, MD;  Location: [facility] OR     ESOPHAGOSCOPY, GASTROSCOPY, DUODENOSCOPY (EGD), COMBINED N/A 5/20/2015    Procedure: COMBINED ESOPHAGOSCOPY, GASTROSCOPY, DUODENOSCOPY (EGD), BIOPSY SINGLE OR MULTIPLE;  Surgeon: Duane, William Charles, MD;  Location: [facility] OR       Social History   Substance Use Topics     Smoking status: Never Smoker     Smokeless tobacco: Never Used     Alcohol use No       Most Recent Immunizations   Administered Date(s) Administered     DTAP (<7y) 08/17/1987     MMR 08/11/1994     Poliovirus, inactivated (IPV) 08/17/1987     TD (ADULT, 7+) 09/04/1996       BMI: Estimated body mass index is 29.7 kg/(m^2) as calculated from the following:    Height as of 5/16/16: 1.778 m (5' 10\").    Weight as of 8/11/17: 93.9 kg (207 lb).      Review of Systems  ROS: All other systems reviewed and are negative.    Physical Exam   BP: (!) 132/99  Pulse: 79  Temp: 98.6  F (37  C)  Resp: (!) 36  SpO2: 99 %  Physical Exam  Nontoxic appearing, hyperventilating  Head atraumatic normocephalic  Lungs clear  Heart regular no murmur  Abdomen soft mild left lower quadrant tenderness without guarding or rebound  Skin pink warm and dry  ED Course     ED Course     Procedures             Critical Care time:  none     Results for orders placed or performed during the hospital encounter of 08/12/17   CBC with platelets differential   Result Value Ref Range    WBC 16.5 (H) 4.0 - 11.0 10e9/L    RBC Count 5.48 4.4 - 5.9 10e12/L    Hemoglobin 16.2 13.3 - 17.7 g/dL    Hematocrit 45.2 40.0 - 53.0 %    MCV 83 78 - 100 fl    MCH 29.6 26.5 - 33.0 pg    MCHC 35.8 31.5 - 36.5 g/dL    RDW 11.8 10.0 - 15.0 %    Platelet Count 236 150 - 450 10e9/L    Diff Method Automated Method     % Neutrophils 88.5 %    % Lymphocytes 4.0 %    % Monocytes 7.1 %    % Eosinophils 0.1 %    % Basophils 0.1 %    % Immature Granulocytes 0.2 %    Absolute Neutrophil " 14.6 (H) 1.6 - 8.3 10e9/L    Absolute Lymphocytes 0.7 (L) 0.8 - 5.3 10e9/L    Absolute Monocytes 1.2 0.0 - 1.3 10e9/L    Absolute Eosinophils 0.0 0.0 - 0.7 10e9/L    Absolute Basophils 0.0 0.0 - 0.2 10e9/L    Abs Immature Granulocytes 0.0 0 - 0.4 10e9/L   Comprehensive metabolic panel   Result Value Ref Range    Sodium 135 133 - 144 mmol/L    Potassium 3.7 3.4 - 5.3 mmol/L    Chloride 100 94 - 109 mmol/L    Carbon Dioxide 28 20 - 32 mmol/L    Anion Gap 7 3 - 14 mmol/L    Glucose 154 (H) 70 - 99 mg/dL    Urea Nitrogen 19 7 - 30 mg/dL    Creatinine 1.09 0.66 - 1.25 mg/dL    GFR Estimate 77 >60 mL/min/1.7m2    GFR Estimate If Black >90   GFR Calc   >60 mL/min/1.7m2    Calcium 9.3 8.5 - 10.1 mg/dL    Bilirubin Total 0.6 0.2 - 1.3 mg/dL    Albumin 4.3 3.4 - 5.0 g/dL    Protein Total 7.9 6.8 - 8.8 g/dL    Alkaline Phosphatase 55 40 - 150 U/L    ALT 30 0 - 70 U/L    AST 19 0 - 45 U/L   UA reflex to Microscopic   Result Value Ref Range    Color Urine Yellow     Appearance Urine Clear     Glucose Urine Negative NEG mg/dL    Bilirubin Urine Negative NEG    Ketones Urine 10 (A) NEG mg/dL    Specific Gravity Urine 1.020 1.003 - 1.035    Blood Urine Negative NEG    pH Urine 5.5 5.0 - 7.0 pH    Protein Albumin Urine 10 (A) NEG mg/dL    Urobilinogen mg/dL Normal 0.0 - 2.0 mg/dL    Nitrite Urine Negative NEG    Leukocyte Esterase Urine Trace (A) NEG    Source Unspecified Urine     RBC Urine <1 0 - 2 /HPF    WBC Urine 3 (H) 0 - 2 /HPF    Mucous Urine Present (A) NEG /LPF                 Labs Ordered and Resulted from Time of ED Arrival Up to the Time of Departure from the ED   CBC WITH PLATELETS DIFFERENTIAL - Abnormal; Notable for the following:        Result Value    WBC 16.5 (*)     Absolute Neutrophil 14.6 (*)     Absolute Lymphocytes 0.7 (*)     All other components within normal limits   COMPREHENSIVE METABOLIC PANEL - Abnormal; Notable for the following:     Glucose 154 (*)     All other components within  normal limits   URINE MACROSCOPIC WITH REFLEX TO MICRO - Abnormal; Notable for the following:     Ketones Urine 10 (*)     Protein Albumin Urine 10 (*)     Leukocyte Esterase Urine Trace (*)     WBC Urine 3 (*)     Mucous Urine Present (*)     All other components within normal limits       Assessments & Plan (with Medical Decision Making)  36-year-old male abdominal pain, constipation, constipation resolved after bowel movement, persistent left lower quadrant tenderness, workup significant for leukocytosis white count 16 5, remainder workup unremarkable.  Believe leukocytosis secondary to pain and stress.  Recommending continuing current medications.  Follow-up primary care.  Return criteria reviewed.       I have reviewed the nursing notes.    I have reviewed the findings, diagnosis, plan and need for follow up with the patient.       Discharge Medication List as of 8/12/2017 10:52 PM          Final diagnoses:   Abdominal pain, generalized   Constipation, unspecified constipation type   Panic attack       8/12/2017   [facility] EMERGENCY DEPARTMENT     Mars Frausto MD  08/12/17 [id number]

## 2017-08-13 NOTE — TELEPHONE ENCOUNTER
traZODone (DESYREL) 100 MG tablet 30 tablet 1 8/11/2017  No   Sig: Take 1 tablet (100 mg) by mouth nightly as needed   Class: E-Prescribe   Route: Oral   Order: [id number]   E-Prescribing Status: Receipt confirmed by pharmacy (8/11/2017 10:49 AM CDT)     Routing refill request to provider for review/approval because:  Denied- refilled 8/11/17 #30 refill x1  Kay Koo RN.

## 2017-08-13 NOTE — ED NOTES
Pt has low abd pain, says it became bad about 30 minutes ago, stopped at a restroom and was unable to go. Pt is hyperventilating, says he has pins and needles everywhere.

## 2017-08-13 NOTE — ED NOTES
Up to bathroom   Pt again tried to crawl on  Floor advised pt he could walk we will help him but he needs to be a part of helping himslef he stood up straight and walked without difficulty in to the bathroom  Wife is very supportive and patient with pt

## 2017-08-13 NOTE — ED NOTES
"Reports \" I really need to poop but it hurts'  Denies wanting to try to have a BM  Pt lays on L side hyperventilates and keeps eyes closed wife answers most questions  "

## 2017-08-13 NOTE — ED NOTES
"Reporting large BM \"initialy was brown real hard and constipated then a bit of real dark poop then big gush of diarrhea\"  Pt is resting comfortably on bed is calm and interacting appropriately  Given water and is aware of need for urine sample   "

## 2017-08-13 NOTE — DISCHARGE INSTRUCTIONS

## 2017-08-14 NOTE — TELEPHONE ENCOUNTER
Patient calling and reports diarrhea stools every 15 minutes since home from ER last night. Has been drinking red pedialyte and reports red stools and is not sure if it is blood or the pedialyte. He reports feeling dehydrated and very weak.   Reason for Disposition    [1] Drinking very little AND [2] dehydration suspected (e.g., no urine > 12 hours, very dry mouth, very lightheaded)    Additional Information    Negative: Shock suspected (e.g., cold/pale/clammy skin, too weak to stand, low BP, rapid pulse)    Negative: Difficult to awaken or acting confused  (e.g., disoriented, slurred speech)    Negative: Sounds like a life-threatening emergency to the triager    Negative: Vomiting also present and worse than the diarrhea    Negative: [1] Blood in stool AND [2] without diarrhea    Negative: [1] SEVERE abdominal pain (e.g., excruciating) AND [2] present > 1 hour    Negative: [1] SEVERE abdominal pain AND [2] age > 60    Negative: [1] Blood in the stool AND [2] moderate or large amount of blood    Negative: Black or tarry bowel movements  (Exception: chronic-unchanged  black-grey bowel movements AND is taking iron pills or Pepto-bismol)    Protocols used: DIARRHEA-ADULT-AH

## 2017-08-15 ENCOUNTER — RADIANT APPOINTMENT (OUTPATIENT)
Dept: CT IMAGING | Facility: CLINIC | Age: 37
End: 2017-08-15
Payer: COMMERCIAL

## 2017-08-15 ENCOUNTER — OFFICE VISIT (OUTPATIENT)
Dept: FAMILY MEDICINE | Facility: CLINIC | Age: 37
End: 2017-08-15
Payer: COMMERCIAL

## 2017-08-15 ENCOUNTER — TELEPHONE (OUTPATIENT)
Dept: FAMILY MEDICINE | Facility: CLINIC | Age: 37
End: 2017-08-15

## 2017-08-15 VITALS
WEIGHT: 202.8 LBS | TEMPERATURE: 97.7 F | OXYGEN SATURATION: 98 % | BODY MASS INDEX: 29.1 KG/M2 | DIASTOLIC BLOOD PRESSURE: 89 MMHG | HEART RATE: 85 BPM | SYSTOLIC BLOOD PRESSURE: 122 MMHG

## 2017-08-15 DIAGNOSIS — D72.829 LEUKOCYTOSIS, UNSPECIFIED TYPE: ICD-10-CM

## 2017-08-15 DIAGNOSIS — K52.9 NON-SPECIFIC COLITIS: ICD-10-CM

## 2017-08-15 DIAGNOSIS — R10.84 ABDOMINAL PAIN, GENERALIZED: Primary | ICD-10-CM

## 2017-08-15 DIAGNOSIS — R10.84 ABDOMINAL PAIN, GENERALIZED: ICD-10-CM

## 2017-08-15 LAB
BASOPHILS # BLD AUTO: 0 10E9/L (ref 0–0.2)
BASOPHILS NFR BLD AUTO: 0.2 %
DIFFERENTIAL METHOD BLD: ABNORMAL
EOSINOPHIL # BLD AUTO: 0.1 10E9/L (ref 0–0.7)
EOSINOPHIL NFR BLD AUTO: 0.4 %
ERYTHROCYTE [DISTWIDTH] IN BLOOD BY AUTOMATED COUNT: 12.5 % (ref 10–15)
HCT VFR BLD AUTO: 48.5 % (ref 40–53)
HGB BLD-MCNC: 17.1 G/DL (ref 13.3–17.7)
LYMPHOCYTES # BLD AUTO: 2.6 10E9/L (ref 0.8–5.3)
LYMPHOCYTES NFR BLD AUTO: 14.5 %
MCH RBC QN AUTO: 30.1 PG (ref 26.5–33)
MCHC RBC AUTO-ENTMCNC: 35.3 G/DL (ref 31.5–36.5)
MCV RBC AUTO: 85 FL (ref 78–100)
MONOCYTES # BLD AUTO: 1.4 10E9/L (ref 0–1.3)
MONOCYTES NFR BLD AUTO: 7.9 %
NEUTROPHILS # BLD AUTO: 13.7 10E9/L (ref 1.6–8.3)
NEUTROPHILS NFR BLD AUTO: 77 %
PLATELET # BLD AUTO: 267 10E9/L (ref 150–450)
RBC # BLD AUTO: 5.69 10E12/L (ref 4.4–5.9)
WBC # BLD AUTO: 17.8 10E9/L (ref 4–11)

## 2017-08-15 PROCEDURE — 99214 OFFICE O/P EST MOD 30 MIN: CPT | Performed by: PREVENTIVE MEDICINE

## 2017-08-15 PROCEDURE — 87493 C DIFF AMPLIFIED PROBE: CPT | Mod: 59 | Performed by: PREVENTIVE MEDICINE

## 2017-08-15 PROCEDURE — 87506 IADNA-DNA/RNA PROBE TQ 6-11: CPT | Performed by: PREVENTIVE MEDICINE

## 2017-08-15 PROCEDURE — 36415 COLL VENOUS BLD VENIPUNCTURE: CPT | Performed by: PREVENTIVE MEDICINE

## 2017-08-15 PROCEDURE — 74177 CT ABD & PELVIS W/CONTRAST: CPT | Performed by: STUDENT IN AN ORGANIZED HEALTH CARE EDUCATION/TRAINING PROGRAM

## 2017-08-15 PROCEDURE — 85025 COMPLETE CBC W/AUTO DIFF WBC: CPT | Performed by: PREVENTIVE MEDICINE

## 2017-08-15 RX ORDER — IOPAMIDOL 755 MG/ML
124 INJECTION, SOLUTION INTRAVASCULAR ONCE
Status: COMPLETED | OUTPATIENT
Start: 2017-08-15 | End: 2017-08-15

## 2017-08-15 RX ORDER — CIPROFLOXACIN 500 MG/1
500 TABLET, FILM COATED ORAL 2 TIMES DAILY
Qty: 14 TABLET | Refills: 0 | Status: SHIPPED | OUTPATIENT
Start: 2017-08-15 | End: 2018-02-19

## 2017-08-15 RX ADMIN — IOPAMIDOL 124 ML: 755 INJECTION, SOLUTION INTRAVASCULAR at 15:01

## 2017-08-15 ASSESSMENT — PAIN SCALES - GENERAL: PAINLEVEL: SEVERE PAIN (6)

## 2017-08-15 NOTE — NURSING NOTE
"Chief Complaint   Patient presents with     Abdominal Pain       Initial /89 (BP Location: Left arm, Patient Position: Sitting, Cuff Size: Adult Regular)  Pulse 85  Temp 97.7  F (36.5  C) (Tympanic)  Wt 202 lb 12.8 oz (92 kg)  SpO2 98%  BMI 29.1 kg/m2 Estimated body mass index is 29.1 kg/(m^2) as calculated from the following:    Height as of 5/16/16: 5' 10\" (1.778 m).    Weight as of this encounter: 202 lb 12.8 oz (92 kg).  Medication Reconciliation: complete   Mallory RAMIRES    "

## 2017-08-15 NOTE — MR AVS SNAPSHOT
After Visit Summary   8/15/2017    Francis Sommers    MRN: [id number]           Patient Information     Date Of Birth          1980        Visit Information        Provider Department      8/15/2017 1:20 PM Dorothy Hamlin MD [facility]        Today's Diagnoses     Abdominal pain, generalized    -  1    Leukocytosis, unspecified type        Non-specific colitis          Care Instructions    Based on your medical history and these are the current health maintenance or preventive care services that you are due for (some may have been done at this visit)  Health Maintenance Due   Topic Date Due     TETANUS IMMUNIZATION (SYSTEM ASSIGNED)  09/04/2006     LIPID SCREEN Q5 YR MALE (SYSTEM ASSIGNED)  10/22/2016         [facility] deliver an exceptional experience to you, every time we see you.    If you receive a survey in the mail, please send us back your thoughts. We really do value your feedback.    Your care team's suggested websites for health information:  Www.IFTTT.eZelleron : Up to date and easily searchable information on multiple topics.  Www.medlineplus.gov : medication info, interactive tutorials, watch real surgeries online  Www.familydoctor.org : good info from the Academy of Family Physicians  Www.cdc.gov : public health info, travel advisories, epidemics (H1N1)  Www.aap.org : children's health info, normal development, vaccinations  Www.health.[facility].[place].us : [place] dept of health, public health issues in [place], N1N1    How to contact your care team:   Team Aparna/Yoan [phone]         Pharmacy [phone]    Dr. Stephen, Karly Roman PA-C, Dr. Hamlin, Clarice Wetzel APRN CNP, Flakita Chapa PA-C, Dr. Varghese, and Yanna Baptiste APRN CNP    Team RNs: Deanne & Ignacia      Clinic hours  M-Th 7 am-7 pm   Fri 7 am-5 pm.   Urgent care M-F 11 am-9 pm,   Sat/Sun 9 am-5 pm.  Pharmacy M-Th 8 am-8 pm Fri 8 am-6 pm  Sat/Sun 9 am-5  "pm.     All password changes, disabled accounts, or ID changes in [employer]/MyHealth will be done by our Access Services Department.    If you need help with your account or password, call: [phone]. Clinic staff no longer has the ability to change passwords.             Follow-ups after your visit        Follow-up notes from your care team     Return if symptoms worsen or fail to improve.      Future tests that were ordered for you today     Open Future Orders        Priority Expected Expires Ordered    CBC with platelets differential Routine  8/15/2018 8/15/2017    Clostridium difficile toxin B PCR Routine  12/15/2017 8/15/2017    Enteric Bacteria and Virus Panel by ROSEMARIE Stool Routine  8/15/2018 8/15/2017            Who to contact     If you have questions or need follow up information about today's clinic visit or your schedule please contact [facility] directly at [phone].  Normal or non-critical lab and imaging results will be communicated to you by [employer], letter or phone within 4 business days after the clinic has received the results. If you do not hear from us within 7 days, please contact the clinic through [employer] or phone. If you have a critical or abnormal lab result, we will notify you by phone as soon as possible.  Submit refill requests through [employer] or call your pharmacy and they will forward the refill request to us. Please allow 3 business days for your refill to be completed.          Additional Information About Your Visit        Ellevation Information     [employer]vation lets you send messages to your doctor, view your test results, renew your prescriptions, schedule appointments and more. To sign up, go to [email or web address] . Click on \"Log in\" on the left side of the screen, which will take you to the Welcome page. Then click on \"Sign up Now\" on the right side of the page.     You will be asked to enter the access code listed below, as well as some personal " information. Please follow the directions to create your username and password.     Your access code is: [email or web address]  Expires: 2017 10:55 AM     Your access code will  in 90 days. If you need help or a new code, please call your [place] clinic or [phone].        Care EveryWhere ID     This is your Care EveryWhere ID. This could be used by other organizations to access your [place] medical records  [id number]        Your Vitals Were     Pulse Temperature Pulse Oximetry BMI (Body Mass Index)          85 97.7  F (36.5  C) (Tympanic) 98% 29.1 kg/m2         Blood Pressure from Last 3 Encounters:   08/15/17 122/89   17 118/70   17 127/88    Weight from Last 3 Encounters:   08/15/17 202 lb 12.8 oz (92 kg)   17 207 lb (93.9 kg)   16 187 lb 14.4 oz (85.2 kg)              We Performed the Following     CBC with platelets differential          Today's Medication Changes          These changes are accurate as of: 8/15/17  6:21 PM.  If you have any questions, ask your nurse or doctor.               Start taking these medicines.        Dose/Directions    ciprofloxacin 500 MG tablet   Commonly known as:  CIPRO   Used for:  Non-specific colitis   Started by:  Dorothy Hamlin MD        Dose:  500 mg   Take 1 tablet (500 mg) by mouth 2 times daily   Quantity:  14 tablet   Refills:  0            Where to get your medicines      These medications were sent to [place] [address] [place] [facility]  [address] [facility]  [address] [address]     Phone:  [phone]     ciprofloxacin 500 MG tablet                Primary Care Provider Office Phone # Fax #    Candy Silvano Gutierrez [phone] [address] [facility] [facility] [address]        Equal Access to Services     CHARISSE PRATHER : Mary gurrola Somark, waaxda luqadaha, qaybta kaalmada jeannayateo, samreen shannon. So [facility] [phone].    ATENCIÓN: Si diana nagy  a schaefer disposición servicios gratuitos de asistencia lingüística. Agnieszka levine [phone].    We comply with applicable federal civil rights laws and [place] laws. We do not discriminate on the basis of race, color, national origin, age, disability sex, sexual orientation or gender identity.            Thank you!     Thank you for choosing [facility]  for your care. Our goal is always to provide you with excellent care. Hearing back from our patients is one way we can continue to improve our services. Please take a few minutes to complete the written survey that you may receive in the mail after your visit with us. Thank you!             Your Updated Medication List - Protect others around you: Learn how to safely use, store and throw away your medicines at www.disposemymeds.org.          This list is accurate as of: 8/15/17  6:21 PM.  Always use your most recent med list.                   Brand Name Dispense Instructions for use Diagnosis    ciprofloxacin 500 MG tablet    CIPRO    14 tablet    Take 1 tablet (500 mg) by mouth 2 times daily    Non-specific colitis       FLUoxetine 20 MG capsule    PROzac    30 capsule    Take 1 capsule (20 mg) by mouth daily    Adjustment disorder with mixed anxiety and depressed mood       ondansetron 4 MG tablet    ZOFRAN    15 tablet    Take 1 tablet (4 mg) by mouth every 8 hours as needed for nausea    Nausea and vomiting, intractability of vomiting not specified, unspecified vomiting type       traZODone 100 MG tablet    DESYREL    30 tablet    Take 1 tablet (100 mg) by mouth nightly as needed    Primary insomnia       zolpidem 10 MG tablet    AMBIEN    30 tablet    TAKE 1 TABLET BY MOUTH NIGHTLY AS NEEDED    Primary insomnia

## 2017-08-15 NOTE — LETTER
August 17, 2017      Francis Sommers  [address] [address]            Dear Francis Sommers,    Stool studies did not show any infections with bacteria or viruses. Plan of care and follow up as discussed in clinic. Please let me know if you have any questions and thank you for choosing [facility].    Regards,    Dorothy Hamlin MD MPH/ak        Results for orders placed or performed in visit on 08/15/17   Clostridium difficile toxin B PCR   Result Value Ref Range    Specimen Description Feces     C Diff Toxin B PCR Negative NEG^Negative   Enteric Bacteria and Virus Panel by ROSEMARIE Stool   Result Value Ref Range    Campylobacter group by ROSEMARIE Not Detected NDET^Not Detected    Salmonella species by ROSEMARIE Not Detected NDET^Not Detected    Shigella species by ROSEMARIE Not Detected NDET^Not Detected    Vibrio group by ROSEMARIE Not Detected NDET^Not Detected    Rotavirus A by ROSEMARIE Not Detected NDET^Not Detected    Shiga toxin 1 gene by ROSEMARIE Not Detected NDET^Not Detected    Shiga toxin 2 gene by ROSEMARIE Not Detected NDET^Not Detected    Norovirus I and II by ROSEMARIE Not Detected NDET^Not Detected    Yersinia enterocolitica by ROSEMARIE Not Detected NDET^Not Detected    Enteric pathogen comment       Testing performed by multiplexed, qualitative PCR using the [employer] Enteric   Pathogens Nucleic Acid Test. Results should not be used as the sole basis for diagnosis,   treatment, or other patient management decisions.

## 2017-08-15 NOTE — TELEPHONE ENCOUNTER
Discussed results of CBC and Ct abdomen and pelvis. Please see clinic note for details.    Dorothy Hamlin MD MPH

## 2017-08-15 NOTE — PROGRESS NOTES
"  SUBJECTIVE:                                                    Francis Sommers is a 36 year old male who presents to clinic today for the following health issues:      Abdominal Pain      Duration: 3 days    Description (location/character/radiation): abdominal pain       Associated flank pain: None    Intensity:  Moderate with eating and drinking becomes severe.     Accompanying signs and symptoms:        Fever/Chills: YES       Gas/Bloating: YES       Nausea/vomitting: YES       Diarrhea: YES       Dysuria or Hematuria: no     History (previous similar pain/trauma/previous testing): Stomach problems all his life    Precipitating or alleviating factors:       Pain worse with eating/BM/urination: yes        Pain relieved by BM: no and yes     Therapies tried and outcome: None    LMP:  not applicable    Pain is on the right side and low abdomen. Associated with heartburn, dizziness.   No fever, pain has caused panic attacks.  Liquid stools+  Loose stools+, a few days back was having BM every 15  Minutes.  No travel  No dysuria   No rash  No tick bites   History of IBS, seen by GI in 2015. Had a colonoscopy and Upper GI as well, normal except for reflux esophagitis.   Seen in ER 8/12/17. The following is a summary:    \"36-year-old male abdominal pain, constipation, constipation resolved after bowel movement, persistent left lower quadrant tenderness, workup significant for leukocytosis white count 16 5, remainder workup unremarkable.  Believe leukocytosis secondary to pain and stress.  Recommending continuing current medications.  Follow-up primary care.  Return criteria reviewed.  \"      Problem list and histories reviewed & adjusted, as indicated.  Additional history: as documented    Patient Active Problem List   Diagnosis     CARDIOVASCULAR SCREENING; LDL GOAL LESS THAN 160     Primary insomnia     Past Surgical History:   Procedure Laterality Date     COLONOSCOPY N/A 5/20/2015    Procedure: COMBINED " COLONOSCOPY, SINGLE OR MULTIPLE BIOPSY/POLYPECTOMY BY BIOPSY;  Surgeon: Duane, William Charles, MD;  Location: [facility]     COLONOSCOPY WITH CO2 INSUFFLATION N/A 5/20/2015    Procedure: COLONOSCOPY WITH CO2 INSUFFLATION;  Surgeon: Duane, William Charles, MD;  Location: [facility]     ENDOSCOPY UPPER, COLONOSCOPY, COMBINED N/A 5/20/2015    Procedure: COMBINED ENDOSCOPY UPPER, COLONOSCOPY;  Surgeon: Duane, William Charles, MD;  Location: [facility]     ESOPHAGOSCOPY, GASTROSCOPY, DUODENOSCOPY (EGD), COMBINED N/A 5/20/2015    Procedure: COMBINED ESOPHAGOSCOPY, GASTROSCOPY, DUODENOSCOPY (EGD), BIOPSY SINGLE OR MULTIPLE;  Surgeon: Duane, William Charles, MD;  Location: [facility] OR       Social History   Substance Use Topics     Smoking status: Never Smoker     Smokeless tobacco: Never Used     Alcohol use No     Family History   Problem Relation Age of Onset     CANCER Father 50     lung cancer         Current Outpatient Prescriptions   Medication Sig Dispense Refill     ciprofloxacin (CIPRO) 500 MG tablet Take 1 tablet (500 mg) by mouth 2 times daily 14 tablet 0     traZODone (DESYREL) 100 MG tablet Take 1 tablet (100 mg) by mouth nightly as needed 30 tablet 1     zolpidem (AMBIEN) 10 MG tablet TAKE 1 TABLET BY MOUTH NIGHTLY AS NEEDED 30 tablet 3     FLUoxetine (PROZAC) 20 MG capsule Take 1 capsule (20 mg) by mouth daily 30 capsule 2     ondansetron (ZOFRAN) 4 MG tablet Take 1 tablet (4 mg) by mouth every 8 hours as needed for nausea (Patient not taking: Reported on 8/15/2017) 15 tablet 0     No Known Allergies  BP Readings from Last 3 Encounters:   08/15/17 122/89   08/12/17 118/70   08/11/17 127/88    Wt Readings from Last 3 Encounters:   08/15/17 202 lb 12.8 oz (92 kg)   08/11/17 207 lb (93.9 kg)   05/16/16 187 lb 14.4 oz (85.2 kg)                        Reviewed and updated as needed this visit by clinical staffAllergies       Reviewed and updated as needed this visit by Provider         ROS:  Constitutional, HEENT, cardiovascular,  pulmonary, gi and gu systems are negative, except as otherwise noted.      OBJECTIVE:                                                    /89 (BP Location: Left arm, Patient Position: Sitting, Cuff Size: Adult Regular)  Pulse 85  Temp 97.7  F (36.5  C) (Tympanic)  Wt 202 lb 12.8 oz (92 kg)  SpO2 98%  BMI 29.1 kg/m2  Body mass index is 29.1 kg/(m^2).  GENERAL APPEARANCE: healthy, alert and mild distress  EYES: Eyes grossly normal to inspection and conjunctivae and sclerae normal  NECK: no adenopathy and no asymmetry, masses, or scars  RESP: lungs clear to auscultation - no rales, rhonchi or wheezes  CV: regular rates and rhythm and normal S1 S2, no S3 or S4  ABDOMEN: Non distended, tender in the Right lower quadrant and suprapubic region. No rebound or guarding.   MS: extremities normal- no gross deformities noted  SKIN: no suspicious lesions or rashes  NEURO: Normal strength and tone, mentation intact and speech normal  PSYCH: mentation appears normal    Diagnostic test results:  Diagnostic Test Results:  Results for orders placed or performed in visit on 08/15/17 (from the past 24 hour(s))   CBC with platelets differential   Result Value Ref Range    WBC 17.8 (H) 4.0 - 11.0 10e9/L    RBC Count 5.69 4.4 - 5.9 10e12/L    Hemoglobin 17.1 13.3 - 17.7 g/dL    Hematocrit 48.5 40.0 - 53.0 %    MCV 85 78 - 100 fl    MCH 30.1 26.5 - 33.0 pg    MCHC 35.3 31.5 - 36.5 g/dL    RDW 12.5 10.0 - 15.0 %    Platelet Count 267 150 - 450 10e9/L    Diff Method Automated Method     % Neutrophils 77.0 %    % Lymphocytes 14.5 %    % Monocytes 7.9 %    % Eosinophils 0.4 %    % Basophils 0.2 %    Absolute Neutrophil 13.7 (H) 1.6 - 8.3 10e9/L    Absolute Lymphocytes 2.6 0.8 - 5.3 10e9/L    Absolute Monocytes 1.4 (H) 0.0 - 1.3 10e9/L    Absolute Eosinophils 0.1 0.0 - 0.7 10e9/L    Absolute Basophils 0.0 0.0 - 0.2 10e9/L            Exam: CT abdomen pelvis with contrast 8/15/2017 2:59 PM.     History: 36-year-old male with generalized  abdominal pain and  leukocytosis.     Comparison: CT abdomen/pelvis from 4/28/2015.     Technique: Using multidetector thin collimation helical acquisition  technique, axial, coronal and sagittal CT images from the lung bases  through the symphysis pubis after the uneventful administration of IV  contrast.     Contrast: ISOVUE 370 124 ML      Dose: 585 mGy*cm     Findings:   The liver, gallbladder, spleen, and pancreas are unremarkable. Small  left upper quadrant splenule. No intra or extrahepatic biliary  dilatation. The adrenal glands and kidneys are normal. There is no  hydronephrosis, hydroureter, or renal or ureteral calculi. The bladder  is decompressed. The prostate is normal.      Circumferential thickening of the descending colon, extending to the  splenic flexure, with mild surrounding fat stranding. No pericolonic  fluid collection.  There is infiltration of the submucosa fat  involving the terminal ileum, although nonspecific it can be  associated with inflammatory bowel disease in the appropriate clinical  context.  No abnormally dilated bowel. No extraluminal air,  pneumatosis or portal venous gas. The appendix is normal. Scattered  sigmoid colonic diverticula.  No intra-abdominal or pelvic free fluid.  Major abdominal vasculature is patent and normal in caliber.      The visualized lung bases are clear. No suspicious bony lesions.          Impression: Circumferential thickening of the descending clonic wall,  with mild surrounding inflammatory changes consistent with nonspecific  infectious or inflammatory colitis.     I have personally reviewed the examination and initial interpretation  and I agree with the findings.     LEBRON BLANK MD       ASSESSMENT/PLAN:                                                    1. Abdominal pain, generalized  --ER precautions reviewed in detail. If increased abdominal pain, fever over 101 F, emesis, rectal bleeding, melena, then needs to be seen in ER, patient  expressed comprehension of this.   - CBC with platelets differential  - CT Abdomen Pelvis w Contrast; Future  - CBC with platelets differential; Future  -Clear liquid diet and hydration   -No past history of inflammatory bowel disease   -Recheck CBC in one week to ensure white cell count is normal     2. Leukocytosis, unspecified type  -Increased white cell count from 16.5 to 17.8  - CT Abdomen Pelvis w Contrast; Future  - CBC with platelets differential; Future    3. Non-specific colitis  -Stool studies before starting antibiotics   - Clostridium difficile toxin B PCR; Future  - Enteric Bacteria and Virus Panel by [facility] Stool; Future  - ciprofloxacin (CIPRO) 500 MG tablet; Take 1 tablet (500 mg) by mouth 2 times daily  Dispense: 14 tablet; Refill: 0      Follow up with Provider - if not improving in 48 hours    Labs and imaging results were discussed with patient on 8/15/17 at 6:20 pm     Dorothy Hamlin MD MPH    [facility]

## 2017-08-15 NOTE — PROGRESS NOTES
This RN was called to assess patient at 1525 along with radiologist after patient experienced increased abdominal pain following his CT. Patient was breathing rapidly, pink, warm except hands and shaking visibly. /79, HR 88, RR 24-26, sats 100% RA, lungs CTA, heart rate strong and regular, abdomen soft with hyperactive bowel sounds. Gave patient a warm blanket and encouraged him to take deep breaths and try to rest/relax. After assessing patient and reassuring him, let him rest for 10 minutes. Upon recheck at 1538 /74, HR 73, RR 16, sats 96%. Patient stated his symptoms were mostly resolved. Had patient sit on edge of gurney then stand. No increase in symptoms. Walked along patient and spouse to lobby. Patient verbalized he felt fine to go home.

## 2017-08-15 NOTE — PATIENT INSTRUCTIONS
Based on your medical history and these are the current health maintenance or preventive care services that you are due for (some may have been done at this visit)  Health Maintenance Due   Topic Date Due     TETANUS IMMUNIZATION (SYSTEM ASSIGNED)  09/04/2006     LIPID SCREEN Q5 YR MALE (SYSTEM ASSIGNED)  10/22/2016         At [facility], we strive to deliver an exceptional experience to you, every time we see you.    If you receive a survey in the mail, please send us back your thoughts. We really do value your feedback.    Your care team's suggested websites for health information:  Www.[employer].org : Up to date and easily searchable information on multiple topics.  Www.medlineplus.gov : medication info, interactive tutorials, watch real surgeries online  Www.familydoctor.org : good info from the Academy of Family Physicians  Www.cdc.gov : public health info, travel advisories, epidemics (H1N1)  Www.aap.org : children's health info, normal development, vaccinations  Www.health.[facility].[place].us : [place] dept of health, public health issues in [place], N1N1    How to contact your care team:   Team Aparna/Yoan [phone]         Pharmacy [phone]    Dr. Stephen, Karly Roman PA-C, Dr. Hamlin, Clarice BEAULIEU CNP, Flakita Chapa PA-C, Dr. Varghese, and BRITTNEE Souza CNP    Team RNs: Deanne & Ignacia      Clinic hours  M-Th 7 am-7 pm   Fri 7 am-5 pm.   Urgent care M-F 11 am-9 pm,   Sat/Sun 9 am-5 pm.  Pharmacy M-Th 8 am-8 pm Fri 8 am-6 pm  Sat/Sun 9 am-5 pm.     All password changes, disabled accounts, or ID changes in [employer]/MyHealth will be done by our Access Services Department.    If you need help with your account or password, call: [phone]. Clinic staff no longer has the ability to change passwords.

## 2017-08-16 LAB
C COLI+JEJUNI+LARI FUSA STL QL NAA+PROBE: NOT DETECTED
C DIFF TOX B STL QL: NEGATIVE
EC STX1 GENE STL QL NAA+PROBE: NOT DETECTED
EC STX2 GENE STL QL NAA+PROBE: NOT DETECTED
ENTERIC PATHOGEN COMMENT: NORMAL
NOROV GI+II ORF1-ORF2 JNC STL QL NAA+PR: NOT DETECTED
RVA NSP5 STL QL NAA+PROBE: NOT DETECTED
SALMONELLA SP RPOD STL QL NAA+PROBE: NOT DETECTED
SHIGELLA SP+EIEC IPAH STL QL NAA+PROBE: NOT DETECTED
SPECIMEN SOURCE: NORMAL
V CHOL+PARA RFBL+TRKH+TNAA STL QL NAA+PR: NOT DETECTED
Y ENTERO RECN STL QL NAA+PROBE: NOT DETECTED

## 2017-08-16 NOTE — PROGRESS NOTES
Results were discussed with patient by phone on 8/15/17. Please see clinic note for details.  Dorothy Hamlin MD MPH

## 2017-08-16 NOTE — PROGRESS NOTES
Results were discussed with patient by phone on 8/15/17. Please see Telephone encounter and clinic note for details.  Dorothy Hamlin MD MPH

## 2017-08-17 NOTE — PROGRESS NOTES
Please send a letter:    Dear Francis Sommers,    Stool studies did not show any infections with bacteria or viruses. Plan of care and follow up as discussed in clinic. Please let me know if you have any questions and thank you for choosing [facility].    Regards,    Dorothy Hamlin MD MPH

## 2017-12-18 DIAGNOSIS — F51.01 PRIMARY INSOMNIA: ICD-10-CM

## 2017-12-19 RX ORDER — ZOLPIDEM TARTRATE 10 MG/1
10 TABLET ORAL
Qty: 30 TABLET | Refills: 0 | Status: SHIPPED | OUTPATIENT
Start: 2017-12-19 | End: 2018-01-19

## 2018-01-19 DIAGNOSIS — F51.01 PRIMARY INSOMNIA: ICD-10-CM

## 2018-01-19 RX ORDER — ZOLPIDEM TARTRATE 10 MG/1
10 TABLET ORAL
Qty: 15 TABLET | Refills: 0 | Status: SHIPPED | OUTPATIENT
Start: 2018-01-19 | End: 2018-02-16

## 2018-01-19 NOTE — TELEPHONE ENCOUNTER
Requested Prescriptions   Pending Prescriptions Disp Refills     zolpidem (AMBIEN) 10 MG tablet    Last Written Prescription Date:  12/19/17  Last Fill Quantity: 30,  # refills: 0   Last Office Visit with [facility], [facility] or [facility] prescribing provider:  8/15/17   Future Office Visit:      30 tablet 0     Sig: Take 1 tablet (10 mg) by mouth nightly as needed for sleep .  Please schedule an appt for further refills.    There is no refill protocol information for this order              Michael Faarax  [facility]

## 2018-01-19 NOTE — TELEPHONE ENCOUNTER
Routing refill request to provider for review/approval because:  Drug not on the [facility] refill protocol     Liliana Dominguez RN   [facility]

## 2018-02-16 ENCOUNTER — TELEPHONE (OUTPATIENT)
Dept: FAMILY MEDICINE | Facility: CLINIC | Age: 38
End: 2018-02-16

## 2018-02-16 DIAGNOSIS — F51.01 PRIMARY INSOMNIA: ICD-10-CM

## 2018-02-16 RX ORDER — ZOLPIDEM TARTRATE 10 MG/1
10 TABLET ORAL
Qty: 15 TABLET | Refills: 0 | Status: SHIPPED | OUTPATIENT
Start: 2018-02-16 | End: 2018-02-19

## 2018-02-16 NOTE — TELEPHONE ENCOUNTER
..Reason for Call:   prescription    Detailed comments: Patient would like to get a refill on the AMBIEN til he can be seen.    Phone Number Patient can be reached at: Home number on file [phone] (home)    Best Time: anytime    Can we leave a detailed message on this number? YES    Call taken on 2/16/2018 at 12:28 PM by Gerda De La Garza

## 2018-02-16 NOTE — TELEPHONE ENCOUNTER
Requested Prescriptions   Pending Prescriptions Disp Refills     zolpidem (AMBIEN) 10 MG tablet      Last Written Prescription Date:  01/19/18  Last Fill Quantity: 15 tablet,   # refills: 0  Last Office Visit: 8/15/17  Future Office visit:       Routing refill request to provider for review/approval because:  Drug not on the [facility], [facility] or [facility] refill protocol or controlled substance 15 tablet 0     Sig: Take 1 tablet (10 mg) by mouth nightly as needed for sleep .  Please schedule an appt for further refills.    There is no refill protocol information for this order

## 2018-02-19 ENCOUNTER — VIRTUAL VISIT (OUTPATIENT)
Dept: FAMILY MEDICINE | Facility: CLINIC | Age: 38
End: 2018-02-19

## 2018-02-19 DIAGNOSIS — F51.01 PRIMARY INSOMNIA: ICD-10-CM

## 2018-02-19 PROCEDURE — 99441 ZZC PHYSICIAN TELEPHONE EVALUATION 5-10 MIN: CPT | Performed by: FAMILY MEDICINE

## 2018-02-19 RX ORDER — ZOLPIDEM TARTRATE 10 MG/1
10 TABLET ORAL
Qty: 30 TABLET | Refills: 5 | Status: SHIPPED | OUTPATIENT
Start: 2018-02-19 | End: 2018-08-20

## 2018-02-19 NOTE — MR AVS SNAPSHOT
"              After Visit Summary   2018    Francis Sommers    MRN: [id number]           Patient Information     Date Of Birth          1980        Visit Information        Provider Department      2018 6:00 PM Candy Gutierrez MD [facility]        Today's Diagnoses     Primary insomnia           Follow-ups after your visit        Follow-up notes from your care team     Return in about 1 year (around 2019).      Who to contact     If you have questions or need follow up information about today's clinic visit or your schedule please contact [facility] directly at [phone].  Normal or non-critical lab and imaging results will be communicated to you by MyChart, letter or phone within 4 business days after the clinic has received the results. If you do not hear from us within 7 days, please contact the clinic through [employer] or phone. If you have a critical or abnormal lab result, we will notify you by phone as soon as possible.  Submit refill requests through [employer] or call your pharmacy and they will forward the refill request to us. Please allow 3 business days for your refill to be completed.          Additional Information About Your Visit        MyChart Information     [employer] lets you send messages to your doctor, view your test results, renew your prescriptions, schedule appointments and more. To sign up, go to [email or web address] . Click on \"Log in\" on the left side of the screen, which will take you to the Welcome page. Then click on \"Sign up Now\" on the right side of the page.     You will be asked to enter the access code listed below, as well as some personal information. Please follow the directions to create your username and password.     Your access code is: [email or web address]  Expires: 2018  6:14 PM     Your access code will  in 90 days. If you need help or a new code, please call your [facility] or [phone].      "   Care EveryWhere ID     This is your Care EveryWhere ID. This could be used by other organizations to access your [facility] medical records  [id number]         Blood Pressure from Last 3 Encounters:   08/15/17 122/89   08/12/17 118/70   08/11/17 127/88    Weight from Last 3 Encounters:   08/15/17 92 kg (202 lb 12.8 oz)   08/11/17 93.9 kg (207 lb)   05/16/16 85.2 kg (187 lb 14.4 oz)              Today, you had the following     No orders found for display         Where to get your medicines      Some of these will need a paper prescription and others can be bought over the counter.  Ask your nurse if you have questions.     Bring a paper prescription for each of these medications     zolpidem 10 MG tablet          Primary Care Provider Office Phone # Fax #    Candy Silvano Gutierrez [phone] [address] [facility] [address]        Equal Access to Services     CHARISSE PRATHER : Hadii aad ku hadasho Soomaali, waaxda luqadaha, qaybta kaalmada adeegyada, waxay katherinin hayjill hoskins . So [facility] [phone].    ATENCIÓN: Si habla español, tiene a schaefer disposición servicios gratuitos de asistencia lingüística. Llame al [phone].    We comply with applicable federal civil rights laws and [place] laws. We do not discriminate on the basis of race, color, national origin, age, disability, sex, sexual orientation, or gender identity.            Thank you!     Thank you for choosing [facility]  for your care. Our goal is always to provide you with excellent care. Hearing back from our patients is one way we can continue to improve our services. Please take a few minutes to complete the written survey that you may receive in the mail after your visit with us. Thank you!             Your Updated Medication List - Protect others around you: Learn how to safely use, store and throw away your medicines at www.disposemymeds.org.          This list is accurate as of  2/19/18  6:14 PM.  Always use your most recent med list.                   Brand Name Dispense Instructions for use Diagnosis    zolpidem 10 MG tablet    AMBIEN    30 tablet    Take 1 tablet (10 mg) by mouth nightly as needed for sleep .  Please schedule an appt for further refills.    Primary insomnia

## 2018-02-19 NOTE — PROGRESS NOTES
"Francis Sommers is a 37 year old male who is being evaluated via a telephone visit.      The patient has been notified of following:     \"This telephone visit will be conducted via a call between you and your physician/provider. We have found that certain health care needs can be provided without the need for a physical exam.  This service lets us provide the care you need with a short phone conversation.  If a prescription is necessary we can send it directly to your pharmacy.  If lab work is needed we can place an order for that and you can then stop by our lab to have the test done at a later time.    We will bill your insurance company for this service.  Please check with your medical insurance if this type of visit is covered. You may be responsible for the cost of this type of visit if insurance coverage is denied.  The typical cost is $30 (10min), $59 (11-20min) and $85 (21-30min).  Most often these visits are shorter than 10 minutes.    If during the course of the call the physician/provider feels a telephone visit is not appropriate, you will not be charged for this service.\"       Consent has been obtained for this service by care team member: yes.   See the scanned image in the medical record.    Francis Sommers complains of  Refill Request (Zolpidem )      I have reviewed and updated the patient's Past Medical History, Social History, Family History and Medication List.    ALLERGIES  Review of patient's allergies indicates no known allergies.    Mary Lou Altamirano, Medical Assistant   (MA signature)    Additional provider notes:   Spoke with patient via phone regarding ongoing insomnia.  This is been a long-standing issue for which I have treated him for years.  Has done very well without any significant side effects with Ambien.  We have also used trazodone in conjunction with this but he stopped it a number of months ago as he found he was having trouble with \"moodiness.\"  Seem to get a lot better off " the trazodone any sleeping just as well.  Has tried over-the-counter agents for sleep and these have not been effective.  Dealt with a bout of abdominal pain, possibly colitis, last summer but that seems to be gone and no more problems with.    ASSESSMENT / PLAN:  (F51.01) Primary insomnia  Comment: We will continue current dosage.  Not experiencing any long-term side effects  Plan: zolpidem (AMBIEN) 10 MG tablet        Follow-up annually.  Okay to refill after 6 months       I have reviewed the note as documented above.  This accurately captures the substance of my conversation with the patient,  SYajaira Gutierrez M.D.     Total time of call between patient and provider was 7 minutes

## 2018-02-19 NOTE — TELEPHONE ENCOUNTER
Reason for Call:  Other prescription    Detailed comments: patient requesting the prescription to be sent [facility] [address] [place] [facility] [place] [facility] [address]    Phone Number Patient can be reached at: Home number on file [phone] (home)    Best Time: any    Can we leave a detailed message on this number? YES    Call taken on 2/19/2018 at 9:43 AM by Brittany العلي

## 2018-03-12 ENCOUNTER — OFFICE VISIT (OUTPATIENT)
Dept: URGENT CARE | Facility: URGENT CARE | Age: 38
End: 2018-03-12

## 2018-03-12 VITALS
DIASTOLIC BLOOD PRESSURE: 80 MMHG | OXYGEN SATURATION: 100 % | HEART RATE: 95 BPM | SYSTOLIC BLOOD PRESSURE: 139 MMHG | WEIGHT: 208.2 LBS | TEMPERATURE: 98.4 F | BODY MASS INDEX: 29.87 KG/M2

## 2018-03-12 DIAGNOSIS — R00.0 TACHYCARDIA: Primary | ICD-10-CM

## 2018-03-12 DIAGNOSIS — J01.90 ACUTE SINUSITIS WITH SYMPTOMS > 10 DAYS: ICD-10-CM

## 2018-03-12 PROCEDURE — 93000 ELECTROCARDIOGRAM COMPLETE: CPT | Performed by: PHYSICIAN ASSISTANT

## 2018-03-12 PROCEDURE — 99214 OFFICE O/P EST MOD 30 MIN: CPT | Performed by: PHYSICIAN ASSISTANT

## 2018-03-12 RX ORDER — AMOXICILLIN 875 MG
875 TABLET ORAL 2 TIMES DAILY
Qty: 20 TABLET | Refills: 0 | Status: SHIPPED | OUTPATIENT
Start: 2018-03-12 | End: 2019-03-04

## 2018-03-12 NOTE — PROGRESS NOTES
HPI:Francis Sommers is a 37 year old male here today with complaint of rapid heart rate x 1 week. Not palpitations, just beating fast. Measures it- range 105 to 108 with highest of 118. When beating really fast once he felt some aching in the left chest. Was dizzy this AM when he stood up too fast. Here with his wife. He is very anxious. They were just cut off their insurance. They were told they made too much money. No chest pain now. No FHX of MI, arrhythmias or strokes. Says he is healthy. No paresthesias. No SOB    Also has had sinus congestion, facial pressure, PND for 1.5 months. Does have a history of sinus infections.    No Known Allergies    Past Medical History:   Diagnosis Date     Insomnia 2/15/2011         Current Outpatient Prescriptions on File Prior to Visit:  zolpidem (AMBIEN) 10 MG tablet Take 1 tablet (10 mg) by mouth nightly as needed for sleep .  Please schedule an appt for further refills.     No current facility-administered medications on file prior to visit.     Social History   Substance Use Topics     Smoking status: Never Smoker     Smokeless tobacco: Never Used     Alcohol use No       ROS:  General: negative for fever  Resp: chest pain as above  CV: + as above  ABD: Negative for abd pain.  Neurologic:negative for headache    OBJECTIVE:  /80 (BP Location: Left arm, Patient Position: Chair, Cuff Size: Adult Regular)  Pulse 95  Temp 98.4  F (36.9  C) (Oral)  Wt 208 lb 3.2 oz (94.4 kg)  SpO2 100%  BMI 29.87 kg/m2   General:   awake, alert, and cooperative.  NAD.   Head: Normocephalic, atraumatic.  Eyes: Conjunctiva clear,   Heart: Regular rate and rhythm. No murmur.  Lungs: Chest is clear; no wheezes or rales.   Neuro: Alert and oriented - normal speech.  TM's translucent.  Pharynx- no erythema. Some PND noted.  Neck- supple, no LA  Sinuses NT.  Nasal turbinates red and inflamed.    EKG- rate 82. I see some possible borderline ST elevation in the anterolateral  leads    ASSESSMENT:    ICD-10-CM    1. Tachycardia R00.0 EKG 12-lead complete w/read - Clinics   2. Acute sinusitis with symptoms > 10 days J01.90 amoxicillin (AMOXIL) 875 MG tablet         PLAN:I told him with heart concerns it is best to go to the ER for evaluation as we are not equipped to do all of the necessary testing. Here today he has no tachycardia. I told him I cannot definitely rule out a problem with his heart with an EKG alone. I see no acute findings of an MI or arrhythmia such as a fib on his EKG.  He has no health insurance and wants minimal testing and does not want to go to the ER. His wife says he is very anxious and worries all the time. I will treat him for sinusitis and see if this helps with his heart rate. Illness can increase heart rate as well as anxiety. To ER if  symptoms persist or worsen, new symptoms develop.    Advised about symptoms which might herald more serious problems.      Jennie Hughes PA-C

## 2018-03-12 NOTE — MR AVS SNAPSHOT
"              After Visit Summary   3/12/2018    Fracnis Sommers    MRN: [id number]           Patient Information     Date Of Birth          1980        Visit Information        Provider Department      3/12/2018 3:40 PM Jennie Hughes PA-C [facility]        Today's Diagnoses     Tachycardia    -  1    Acute sinusitis with symptoms > 10 days           Follow-ups after your visit        Who to contact     If you have questions or need follow up information about today's clinic visit or your schedule please contact [facility] directly at [phone].  Normal or non-critical lab and imaging results will be communicated to you by [employer], letter or phone within 4 business days after the clinic has received the results. If you do not hear from us within 7 days, please contact the clinic through [employer] or phone. If you have a critical or abnormal lab result, we will notify you by phone as soon as possible.  Submit refill requests through [employer] or call your pharmacy and they will forward the refill request to us. Please allow 3 business days for your refill to be completed.          Additional Information About Your Visit        MyChart Information     [employer] lets you send messages to your doctor, view your test results, renew your prescriptions, schedule appointments and more. To sign up, go to [email or web address] . Click on \"Log in\" on the left side of the screen, which will take you to the Welcome page. Then click on \"Sign up Now\" on the right side of the page.     You will be asked to enter the access code listed below, as well as some personal information. Please follow the directions to create your username and password.     Your access code is: [email or web address]  Expires: 2018  7:14 PM     Your access code will  in 90 days. If you need help or a new code, please call your [facility] or [phone].        Care EveryWhere ID     This is your " Care EveryWhere ID. This could be used by other organizations to access your [facility] medical records  [id number]        Your Vitals Were     Pulse Temperature Pulse Oximetry BMI (Body Mass Index)          95 98.4  F (36.9  C) (Oral) 100% 29.87 kg/m2         Blood Pressure from Last 3 Encounters:   03/12/18 139/80   08/15/17 122/89   08/12/17 118/70    Weight from Last 3 Encounters:   03/12/18 208 lb 3.2 oz (94.4 kg)   08/15/17 202 lb 12.8 oz (92 kg)   08/11/17 207 lb (93.9 kg)              We Performed the Following     EKG 12-lead complete w/read - Clinics          Today's Medication Changes          These changes are accurate as of 3/12/18  9:15 PM.  If you have any questions, ask your nurse or doctor.               Start taking these medicines.        Dose/Directions    amoxicillin 875 MG tablet   Commonly known as:  AMOXIL   Used for:  Acute sinusitis with symptoms > 10 days   Started by:  Jennie Hughes PA-C        Dose:  875 mg   Take 1 tablet (875 mg) by mouth 2 times daily   Quantity:  20 tablet   Refills:  0            Where to get your medicines      These medications were sent to [place] [address] [place] [facility] [place], [place] - [address] [facility]  [address] [address]     Phone:  [phone]     amoxicillin 875 MG tablet                Primary Care Provider Office Phone # Fax #    Candy Silvano Gutierrez [phone] [address] [facility] [address]        Equal Access to Services     [facility] YAMILKA : Hadii wilber cheno Somark, waaxda luqadaha, qaybta kaalmada daniel, samreen shannon. So [facility] [phone].    ATENCIÓN: Si habla español, tiene a schaefer disposición servicios gratuitos de asistencia lingüística. Llame al [phone].    We comply with applicable federal civil rights laws and [place] laws. We do not discriminate on the basis of race, color, national origin, age, disability, sex, sexual orientation, or gender  identity.            Thank you!     Thank you for choosing [facility]  for your care. Our goal is always to provide you with excellent care. Hearing back from our patients is one way we can continue to improve our services. Please take a few minutes to complete the written survey that you may receive in the mail after your visit with us. Thank you!             Your Updated Medication List - Protect others around you: Learn how to safely use, store and throw away your medicines at www.disposemymeds.org.          This list is accurate as of 3/12/18  9:15 PM.  Always use your most recent med list.                   Brand Name Dispense Instructions for use Diagnosis    amoxicillin 875 MG tablet    AMOXIL    20 tablet    Take 1 tablet (875 mg) by mouth 2 times daily    Acute sinusitis with symptoms > 10 days       zolpidem 10 MG tablet    AMBIEN    30 tablet    Take 1 tablet (10 mg) by mouth nightly as needed for sleep .  Please schedule an appt for further refills.    Primary insomnia

## 2018-03-12 NOTE — NURSING NOTE
"Chief Complaint   Patient presents with     Tachycardia     Patient complains of rapid heart rate        Initial /80 (BP Location: Left arm, Patient Position: Chair, Cuff Size: Adult Regular)  Pulse 95  Temp 98.4  F (36.9  C) (Oral)  Wt 208 lb 3.2 oz (94.4 kg)  SpO2 100%  BMI 29.87 kg/m2 Estimated body mass index is 29.87 kg/(m^2) as calculated from the following:    Height as of 5/16/16: 5' 10\" (1.778 m).    Weight as of this encounter: 208 lb 3.2 oz (94.4 kg).  Medication Reconciliation: complete         Kelly Frausto    "

## 2018-08-20 DIAGNOSIS — F51.01 PRIMARY INSOMNIA: ICD-10-CM

## 2018-08-21 RX ORDER — ZOLPIDEM TARTRATE 10 MG/1
TABLET ORAL
Qty: 30 TABLET | Refills: 2 | Status: SHIPPED | OUTPATIENT
Start: 2018-08-21 | End: 2018-11-27

## 2018-08-21 NOTE — TELEPHONE ENCOUNTER
Requested Prescriptions   Pending Prescriptions Disp Refills     zolpidem (AMBIEN) 10 MG tablet [Pharmacy Med Name: ZOLPIDEM TARTRATE 10 MG TABLET]      Last Written Prescription Date:  2/19/18  Last Fill Quantity: 30 tablet,   # refills: 5  Last Office Visit: 2/19/18 Dr. Gutierrez virtual visit  Future Office visit:       Routing refill request to provider for review/approval because:  Drug not on the [facility], [facility] or [facility] refill protocol or controlled substance 30 tablet      Sig: TAKE 1 TAB BY MOUTH NIGHTLY AS NEEDED FOR SLEEP FOR MORE REFILLS SCHEDULE APT    There is no refill protocol information for this order        Medication Detail      Disp Refills Start End JR   zolpidem (AMBIEN) 10 MG tablet 30 tablet 5 2/19/2018  No   Sig - Route: Take 1 tablet (10 mg) by mouth nightly as needed for sleep .  Please schedule an appt for further refills. - Oral   Class: Local Print   Order: [id number]

## 2018-11-27 DIAGNOSIS — F51.01 PRIMARY INSOMNIA: ICD-10-CM

## 2018-11-28 RX ORDER — ZOLPIDEM TARTRATE 10 MG/1
TABLET ORAL
Qty: 30 TABLET | Refills: 2 | Status: SHIPPED | OUTPATIENT
Start: 2018-11-28 | End: 2019-02-19

## 2018-11-28 NOTE — TELEPHONE ENCOUNTER
Requested Prescriptions   Pending Prescriptions Disp Refills     zolpidem (AMBIEN) 10 MG tablet [Pharmacy Med Name: ZOLPIDEM TARTRATE 10 MG TABLET] 30 tablet 2     Sig: TAKE 1 TABLET BY MOUTH NIGHTLY AS NEEDED FOR SLEEP.    There is no refill protocol information for this order        Routing refill request to provider for review/approval because:  Drug not on the [facility] refill protocol           Awa Encinas RN, BSN

## 2019-03-04 ENCOUNTER — OFFICE VISIT (OUTPATIENT)
Dept: FAMILY MEDICINE | Facility: CLINIC | Age: 39
End: 2019-03-04

## 2019-03-04 VITALS
TEMPERATURE: 98.3 F | BODY MASS INDEX: 31.1 KG/M2 | DIASTOLIC BLOOD PRESSURE: 78 MMHG | HEART RATE: 80 BPM | WEIGHT: 210 LBS | HEIGHT: 69 IN | SYSTOLIC BLOOD PRESSURE: 122 MMHG | OXYGEN SATURATION: 97 %

## 2019-03-04 DIAGNOSIS — R00.2 PALPITATIONS: ICD-10-CM

## 2019-03-04 DIAGNOSIS — F51.01 PRIMARY INSOMNIA: Primary | ICD-10-CM

## 2019-03-04 PROCEDURE — 99214 OFFICE O/P EST MOD 30 MIN: CPT | Performed by: FAMILY MEDICINE

## 2019-03-04 RX ORDER — ZOLPIDEM TARTRATE 10 MG/1
10 TABLET ORAL
Qty: 30 TABLET | Refills: 5 | Status: SHIPPED | OUTPATIENT
Start: 2019-03-04 | End: 2019-09-13

## 2019-03-04 ASSESSMENT — MIFFLIN-ST. JEOR: SCORE: 1872.54

## 2019-03-04 NOTE — PROGRESS NOTES
"  SUBJECTIVE:   Francis Sommers is a 38 year old male who presents to clinic today for the following health issues:      Medication Followup of Ambien     Taking Medication as prescribed: yes    Side Effects:  None    Medication Helping Symptoms:  yes     SUBJECTIVE:  Here today primarily in follow-up of insomnia.  Has been on zolpidem nightly for quite some time and done very well with no side effects.  When he was taking trazodone in addition to this in the past he developed some daytime fogginess that improved when stopping the medication.  He also notes that he is been having some issues with his \"heart.\"  At times he notices that his heart seems to beat more prominently.  Sometimes it is fast and sometimes it feels like it misses beats.  These are generally nonexertional though he admits he does not do a lot of regular exercise.  Does not miss really make him feel short of breath but he does admit that his exercise tolerance is decreased overall.  No family history of early coronary disease.  He says at times he has checked his blood pressure at a grocery store and seeing numbers around 140/90.  His pulse on his monitor can range from .  It is not particularly painful but just more noticeable.  We discussed stimulants including stress as potentially involved and he does admit to some ongoing stress but does not think it is necessarily problematic.    Review of systems otherwise negative.  Past medical, family, and social history reviewed and updated in chart.    OBJECTIVE:  /78 (BP Location: Right arm, Patient Position: Chair, Cuff Size: Adult Large)   Pulse 80   Temp 98.3  F (36.8  C) (Oral)   Ht 1.76 m (5' 9.29\")   Wt 95.3 kg (210 lb)   SpO2 97%   BMI 30.75 kg/m    Alert, pleasant, upbeat, and in no apparent discomfort.  S1 and S2 normal, no murmurs, clicks, gallops or rubs. Regular rate and rhythm, with 1 apparent premature beat noted during approximately 30 seconds of auscultation. "   Chest is clear; no wheezes or rales. No edema or JVD.   I note only benign skin findings. No unusual rashes or suspicious skin lesions noted. Nails appear normal.   Past labs reviewed with the patient.     ASSESSMENT / PLAN:  (F51.01) Primary insomnia  (primary encounter diagnosis)  Comment: Doing well on current dosage.  Will refill.  Plan: zolpidem (AMBIEN) 10 MG tablet            (R00.2) Palpitations  Comment: This is a more difficult issue given cost limitations with a lack of insurance.  By exam and description I do not feel that this is anything pathologic.  Certainly does not appear to be ischemic in nature.  Certainly if were to fully work this up this would include laboratory work such as thyroid, blood counts, etc.  But offhand I think this is more of a functional issue as it relates to being somewhat out of shape and some slight lifestyle driven issues.  I did provide some information on palpitations as well as the DASH diet.  Consideration for further workup.  Plan:     Follow up contact me in a few weeks with progress otherwise 6 months  S. Silvano Gutierrez MD    (Chart documentation completed in part with Dragon voice-recognition software.  Even though reviewed some grammatical, spelling, and word errors may remain.)

## 2019-04-08 ENCOUNTER — OFFICE VISIT (OUTPATIENT)
Dept: URGENT CARE | Facility: URGENT CARE | Age: 39
End: 2019-04-08
Payer: COMMERCIAL

## 2019-04-08 VITALS
SYSTOLIC BLOOD PRESSURE: 146 MMHG | HEART RATE: 94 BPM | DIASTOLIC BLOOD PRESSURE: 87 MMHG | OXYGEN SATURATION: 97 % | WEIGHT: 212 LBS | BODY MASS INDEX: 31.4 KG/M2 | RESPIRATION RATE: 18 BRPM | TEMPERATURE: 99 F | HEIGHT: 69 IN

## 2019-04-08 DIAGNOSIS — R10.9 FLANK PAIN: Primary | ICD-10-CM

## 2019-04-08 DIAGNOSIS — R30.0 DYSURIA: ICD-10-CM

## 2019-04-08 LAB
ALBUMIN UR-MCNC: NEGATIVE MG/DL
APPEARANCE UR: CLEAR
BILIRUB UR QL STRIP: NEGATIVE
COLOR UR AUTO: YELLOW
GLUCOSE UR STRIP-MCNC: NEGATIVE MG/DL
HGB UR QL STRIP: NEGATIVE
KETONES UR STRIP-MCNC: NEGATIVE MG/DL
LEUKOCYTE ESTERASE UR QL STRIP: NEGATIVE
NITRATE UR QL: NEGATIVE
PH UR STRIP: 6.5 PH (ref 5–7)
SOURCE: NORMAL
SP GR UR STRIP: 1.01 (ref 1–1.03)
UROBILINOGEN UR STRIP-ACNC: 0.2 EU/DL (ref 0.2–1)

## 2019-04-08 PROCEDURE — 99213 OFFICE O/P EST LOW 20 MIN: CPT | Performed by: PHYSICIAN ASSISTANT

## 2019-04-08 PROCEDURE — 81003 URINALYSIS AUTO W/O SCOPE: CPT | Performed by: PHYSICIAN ASSISTANT

## 2019-04-08 ASSESSMENT — ENCOUNTER SYMPTOMS
COUGH: 0
DIARRHEA: 0
SHORTNESS OF BREATH: 0
CARDIOVASCULAR NEGATIVE: 1
MYALGIAS: 0
HEMATURIA: 0
RHINORRHEA: 0
LIGHT-HEADEDNESS: 0
FLANK PAIN: 1
CHEST TIGHTNESS: 0
ABDOMINAL PAIN: 0
EYES NEGATIVE: 1
EYE DISCHARGE: 0
HEADACHES: 0
CONSTITUTIONAL NEGATIVE: 1
ADENOPATHY: 0
RESPIRATORY NEGATIVE: 1
DYSURIA: 1
DIZZINESS: 0
FREQUENCY: 0
VOMITING: 0
EYE REDNESS: 0
WEAKNESS: 0
FEVER: 0
GASTROINTESTINAL NEGATIVE: 1
NEUROLOGICAL NEGATIVE: 1
SORE THROAT: 0
DIAPHORESIS: 0
WHEEZING: 0
NAUSEA: 0
PALPITATIONS: 0
ENDOCRINE NEGATIVE: 1
CHILLS: 0
POLYDIPSIA: 0
EYE ITCHING: 0

## 2019-04-08 ASSESSMENT — MIFFLIN-ST. JEOR: SCORE: 1878.5

## 2019-04-08 ASSESSMENT — PAIN SCALES - GENERAL: PAINLEVEL: SEVERE PAIN (7)

## 2019-04-08 NOTE — PROGRESS NOTES
Chief Complaint:    Chief Complaint   Patient presents with     UTI     possible bladder infection or kidney stone, back, private part, and side hurt, symptoms started on thurs. or Fri.        HPI:  Francis Sommers is a 38 year old male who has symptoms of urinary dysuria and back pain for 2 day(s).  he denies suprapubic pain and pressure, nausea, vomiting, fever and chills, flank pain, vaginal discharge, and vaginal odor.  Patient is not concerned about STD's and does not want testing for this.  He denies any fever, or vomiting.      ROS:      Review of Systems   Constitutional: Negative.  Negative for chills, diaphoresis and fever.   HENT: Negative.  Negative for congestion, ear pain, rhinorrhea and sore throat.    Eyes: Negative.  Negative for discharge, redness and itching.   Respiratory: Negative.  Negative for cough, chest tightness, shortness of breath and wheezing.    Cardiovascular: Negative.  Negative for chest pain and palpitations.   Gastrointestinal: Negative.  Negative for abdominal pain, diarrhea, nausea and vomiting.   Endocrine: Negative.  Negative for polydipsia and polyuria.   Genitourinary: Positive for dysuria and flank pain. Negative for frequency, hematuria and urgency.   Musculoskeletal: Negative for myalgias.   Skin: Negative for rash.   Allergic/Immunologic: Negative for immunocompromised state.   Neurological: Negative.  Negative for dizziness, weakness, light-headedness and headaches.   Hematological: Negative for adenopathy.       Family History   Family History   Problem Relation Age of Onset     Cancer Father 50        lung cancer        Problem history  Patient Active Problem List   Diagnosis     CARDIOVASCULAR SCREENING; LDL GOAL LESS THAN 160     Primary insomnia        Allergies  No Known Allergies     Social History  Social History     Socioeconomic History     Marital status:      Spouse name: Not on file     Number of children: Not on file     Years of education: Not  "on file     Highest education level: Not on file   Occupational History     Not on file   Social Needs     Financial resource strain: Not on file     Food insecurity:     Worry: Not on file     Inability: Not on file     Transportation needs:     Medical: Not on file     Non-medical: Not on file   Tobacco Use     Smoking status: Never Smoker     Smokeless tobacco: Never Used   Substance and Sexual Activity     Alcohol use: No     Drug use: No     Sexual activity: Yes     Partners: Female   Lifestyle     Physical activity:     Days per week: Not on file     Minutes per session: Not on file     Stress: Not on file   Relationships     Social connections:     Talks on phone: Not on file     Gets together: Not on file     Attends Scientologist service: Not on file     Active member of club or organization: Not on file     Attends meetings of clubs or organizations: Not on file     Relationship status: Not on file     Intimate partner violence:     Fear of current or ex partner: Not on file     Emotionally abused: Not on file     Physically abused: Not on file     Forced sexual activity: Not on file   Other Topics Concern     Parent/sibling w/ CABG, MI or angioplasty before 65F 55M? Not Asked   Social History Narrative     Not on file        Current Meds    Current Outpatient Medications:      zolpidem (AMBIEN) 10 MG tablet, Take 1 tablet (10 mg) by mouth nightly as needed for sleep Needs to be seen for any further refill., Disp: 30 tablet, Rfl: 5     OBJECTIVE     Vital signs noted and reviewed by Kali Grewal  /87 (BP Location: Left arm, Patient Position: Sitting, Cuff Size: Adult Large)   Pulse 94   Temp 99  F (37.2  C) (Oral)   Resp 18   Ht 1.755 m (5' 9.09\")   Wt 96.2 kg (212 lb)   SpO2 97%   BMI 31.22 kg/m       Physical Exam   Constitutional: He appears well-developed and well-nourished. He is cooperative.  Non-toxic appearance. He does not have a sickly appearance. He does not appear ill. No distress. "   HENT:   Head: Normocephalic and atraumatic.   Right Ear: Hearing, tympanic membrane, external ear and ear canal normal. Tympanic membrane is not perforated, not erythematous, not retracted and not bulging.   Left Ear: Hearing, tympanic membrane, external ear and ear canal normal. Tympanic membrane is not perforated, not erythematous, not retracted and not bulging.   Nose: Nose normal. No mucosal edema or rhinorrhea.   Mouth/Throat: Oropharynx is clear and moist and mucous membranes are normal. No oropharyngeal exudate, posterior oropharyngeal edema, posterior oropharyngeal erythema or tonsillar abscesses. Tonsils are 0 on the right. Tonsils are 0 on the left. No tonsillar exudate.   Eyes: Pupils are equal, round, and reactive to light. EOM are normal.   Neck: Normal range of motion. Neck supple.   Cardiovascular: Normal rate, regular rhythm, S1 normal, S2 normal, normal heart sounds and intact distal pulses. Exam reveals no gallop, no distant heart sounds and no friction rub.   No murmur heard.  Pulmonary/Chest: Effort normal and breath sounds normal. No respiratory distress. He has no decreased breath sounds. He has no wheezes. He has no rhonchi. He has no rales.   Abdominal: Soft. Bowel sounds are normal. He exhibits no distension. There is no tenderness.   Lymphadenopathy:     He has no cervical adenopathy.   Neurological: He is alert. No cranial nerve deficit.   Skin: Skin is warm and dry. No rash noted. He is not diaphoretic.   Psychiatric: He has a normal mood and affect. His speech is normal and behavior is normal. Judgment and thought content normal. Cognition and memory are normal. He is attentive.   Nursing note and vitals reviewed.            Labs:     Results for orders placed or performed in visit on 04/08/19   UA reflex to Microscopic and Culture   Result Value Ref Range    Color Urine Yellow     Appearance Urine Clear     Glucose Urine Negative NEG^Negative mg/dL    Bilirubin Urine Negative  NEG^Negative    Ketones Urine Negative NEG^Negative mg/dL    Specific Gravity Urine 1.015 1.003 - 1.035    Blood Urine Negative NEG^Negative    pH Urine 6.5 5.0 - 7.0 pH    Protein Albumin Urine Negative NEG^Negative mg/dL    Urobilinogen Urine 0.2 0.2 - 1.0 EU/dL    Nitrite Urine Negative NEG^Negative    Leukocyte Esterase Urine Negative NEG^Negative    Source Midstream Urine         ASSESSMENT     1. Flank pain    2. Dysuria           PLAN    Urinalysis discussed with patient.  This was unremarkable for UTI.  Patient declined STD testing.  Patient is in no acute distress.  No CVA tenderness.  He is afebrile with stable vital signs.  Push fluids.   I cannot rule out stone at this time.  Patient states he would like to go home and push fluids and see how he does.  Follow up with PCP in 2-3 days if symptoms are not improving.  Worrisome symptoms discussed with instructions to go to the ED.  Patient verbalized understanding and agreed with this plan.          Kali Grewal  4/8/2019, 4:00 PM

## 2019-04-26 ENCOUNTER — OFFICE VISIT (OUTPATIENT)
Dept: FAMILY MEDICINE | Facility: CLINIC | Age: 39
End: 2019-04-26
Payer: COMMERCIAL

## 2019-04-26 VITALS
TEMPERATURE: 98.2 F | WEIGHT: 210.6 LBS | HEART RATE: 77 BPM | SYSTOLIC BLOOD PRESSURE: 110 MMHG | DIASTOLIC BLOOD PRESSURE: 80 MMHG | HEIGHT: 69 IN | RESPIRATION RATE: 18 BRPM | BODY MASS INDEX: 31.19 KG/M2 | OXYGEN SATURATION: 98 %

## 2019-04-26 DIAGNOSIS — R79.89 ABNORMAL TSH: ICD-10-CM

## 2019-04-26 DIAGNOSIS — R00.2 PALPITATIONS: ICD-10-CM

## 2019-04-26 DIAGNOSIS — F41.1 GENERALIZED ANXIETY DISORDER: Primary | ICD-10-CM

## 2019-04-26 DIAGNOSIS — R79.89 ABNORMAL SERUM THYROID STIMULATING HORMONE (TSH) LEVEL: ICD-10-CM

## 2019-04-26 LAB
ANION GAP SERPL CALCULATED.3IONS-SCNC: 9 MMOL/L (ref 3–14)
BUN SERPL-MCNC: 15 MG/DL (ref 7–30)
CALCIUM SERPL-MCNC: 9 MG/DL (ref 8.5–10.1)
CHLORIDE SERPL-SCNC: 105 MMOL/L (ref 94–109)
CO2 SERPL-SCNC: 24 MMOL/L (ref 20–32)
CREAT SERPL-MCNC: 0.88 MG/DL (ref 0.66–1.25)
ERYTHROCYTE [DISTWIDTH] IN BLOOD BY AUTOMATED COUNT: 12.6 % (ref 10–15)
GFR SERPL CREATININE-BSD FRML MDRD: >90 ML/MIN/{1.73_M2}
GLUCOSE SERPL-MCNC: 71 MG/DL (ref 70–99)
HCT VFR BLD AUTO: 46.1 % (ref 40–53)
HGB BLD-MCNC: 16.6 G/DL (ref 13.3–17.7)
MCH RBC QN AUTO: 29.6 PG (ref 26.5–33)
MCHC RBC AUTO-ENTMCNC: 36 G/DL (ref 31.5–36.5)
MCV RBC AUTO: 82 FL (ref 78–100)
PLATELET # BLD AUTO: 279 10E9/L (ref 150–450)
POTASSIUM SERPL-SCNC: 3.5 MMOL/L (ref 3.4–5.3)
RBC # BLD AUTO: 5.61 10E12/L (ref 4.4–5.9)
SODIUM SERPL-SCNC: 138 MMOL/L (ref 133–144)
T4 FREE SERPL-MCNC: 1.05 NG/DL (ref 0.76–1.46)
TSH SERPL DL<=0.005 MIU/L-ACNC: <0.01 MU/L (ref 0.4–4)
WBC # BLD AUTO: 9.3 10E9/L (ref 4–11)

## 2019-04-26 PROCEDURE — 85027 COMPLETE CBC AUTOMATED: CPT | Performed by: PHYSICIAN ASSISTANT

## 2019-04-26 PROCEDURE — 84439 ASSAY OF FREE THYROXINE: CPT | Performed by: PHYSICIAN ASSISTANT

## 2019-04-26 PROCEDURE — 99214 OFFICE O/P EST MOD 30 MIN: CPT | Performed by: PHYSICIAN ASSISTANT

## 2019-04-26 PROCEDURE — 80048 BASIC METABOLIC PNL TOTAL CA: CPT | Performed by: PHYSICIAN ASSISTANT

## 2019-04-26 PROCEDURE — 36415 COLL VENOUS BLD VENIPUNCTURE: CPT | Performed by: PHYSICIAN ASSISTANT

## 2019-04-26 PROCEDURE — 84443 ASSAY THYROID STIM HORMONE: CPT | Performed by: PHYSICIAN ASSISTANT

## 2019-04-26 ASSESSMENT — PATIENT HEALTH QUESTIONNAIRE - PHQ9
SUM OF ALL RESPONSES TO PHQ QUESTIONS 1-9: 4
5. POOR APPETITE OR OVEREATING: MORE THAN HALF THE DAYS

## 2019-04-26 ASSESSMENT — ANXIETY QUESTIONNAIRES
IF YOU CHECKED OFF ANY PROBLEMS ON THIS QUESTIONNAIRE, HOW DIFFICULT HAVE THESE PROBLEMS MADE IT FOR YOU TO DO YOUR WORK, TAKE CARE OF THINGS AT HOME, OR GET ALONG WITH OTHER PEOPLE: SOMEWHAT DIFFICULT
3. WORRYING TOO MUCH ABOUT DIFFERENT THINGS: MORE THAN HALF THE DAYS
6. BECOMING EASILY ANNOYED OR IRRITABLE: MORE THAN HALF THE DAYS
1. FEELING NERVOUS, ANXIOUS, OR ON EDGE: NEARLY EVERY DAY
2. NOT BEING ABLE TO STOP OR CONTROL WORRYING: MORE THAN HALF THE DAYS
GAD7 TOTAL SCORE: 13
7. FEELING AFRAID AS IF SOMETHING AWFUL MIGHT HAPPEN: SEVERAL DAYS
5. BEING SO RESTLESS THAT IT IS HARD TO SIT STILL: SEVERAL DAYS

## 2019-04-26 ASSESSMENT — PAIN SCALES - GENERAL: PAINLEVEL: SEVERE PAIN (7)

## 2019-04-26 ASSESSMENT — MIFFLIN-ST. JEOR: SCORE: 1872.15

## 2019-04-26 NOTE — PATIENT INSTRUCTIONS
Sertraline  1/2 a tablet daily for 1 week then increase to 1 tablet daily   Follow up in 1 month   Patient Education     Anxiety Reaction  Anxiety is the feeling we all get when we think something bad might happen. It is a normal response to stress and usually causes only a mild reaction. When anxiety becomes more severe, it can interfere with daily life. In some cases, you may not even be aware of what it is you re anxious about. There may also be a genetic link or it may be a learned behavior in the home.  Both psychological and physical triggers cause stress reaction. It's often a response to fear or emotional stress, real or imagined. This stress may come from home, family, work, or social relationships.  During an anxiety reaction, you may feel:    Helpless    Nervous    Depressed    Irritable  Your body may show signs of anxiety in many ways. You may experience:    Dry mouth    Shakiness    Dizziness    Weakness    Trouble breathing    Breathing fast (hyperventilating)    Chest pressure    Sweating    Headache    Nausea    Diarrhea    Tiredness    Inability to sleep    Sexual problems  Home care    Try to locate the sources of stress in your life. They may not be obvious. These may include:  ? Daily hassles of life (such as traffic jams, missed appointments, or car troubles)  ? Major life changes, both good (new baby or job promotion) and bad (loss of job or loss of loved one)  ? Overload: feeling that you have too many responsibilities and can't take care of all of them at once  ? Feeling helpless or feeling that your problems are beyond what you re able to solve    Notice how your body reacts to stress. Learn to listen to your body signals. This will help you take action before the stress becomes severe.    When you can, do something about the source of your stress. (Avoid hassles, limit the amount of change that happens in your life at one time and take a break when you feel overloaded).    Unfortunately,  many stressful situations can't be avoided. It is necessary to learn how to better manage stress. There are many proven methods that will reduce your anxiety. These include simple things like exercise, good nutrition, and adequate rest. Also, there are certain techniques that are helpful:  ? Relaxation  ? Breathing exercises  ? Visualization  ? Biofeedback  ? Meditation  For more information about this, consult your healthcare provider or go to a local bookstore and review the many books and tapes available on this subject.  Follow-up care  If you feel that your anxiety is not responding to self-help measures, contact your healthcare provider or make an appointment with a counselor. You may need short-term psychological counseling and temporary medicine to help you manage stress.  Call 911  Call 911 if any of these happen:    Trouble breathing    Confusion    Drowsiness or trouble wakening    Fainting or loss of consciousness    Rapid heart rate    Seizure    New chest pain that becomes more severe, lasts longer, or spreads into your shoulder, arm, neck, jaw, or back  When to seek medical advice  Call your healthcare provider right away if any of these happen:    Your symptoms get worse    Severe headache not relieved by rest and mild pain reliever  Date Last Reviewed: 10/1/2017    [id number] [employer]. [address] [place] [address], [place] [address]. All rights reserved. This information is not intended as a substitute for professional medical care. Always follow your healthcare professional's instructions.

## 2019-04-26 NOTE — PROGRESS NOTES
SUBJECTIVE:   Francis Sommers is a 38 year old male who presents to clinic today for the following   health issues:    Abnormal Mood Symptoms  Onset: Over a year    Description:   Depression: YES, due to anxiety symptom are severe  Anxiety: YES    Accompanying Signs & Symptoms:  Still participating in activities that you used to enjoy: Yes and no things are getting harder   Fatigue: YES  Irritability: YES  Difficulty concentrating: no  Changes in appetite: no  Problems with sleep: YES- But takes a daily ad   Heart racing/beating fast : YES- And skipping a beat and chest thightness  Thoughts of hurting yourself or others: none    History:   Recent stress: no  Prior depression hospitalization: None  Family history of depression: unknown   Family history of anxiety: Unknown     Precipitating factors:   Alcohol/drug use: no    Alleviating factors:  Arms are numb and blotchy, when in stressful situations feels like burning up inside and heart is exploding inside, Sex is hard    Therapies Tried and outcome: None      Patient has full cardiac work up last year in [place] ED and he was told he has severe anxiety    Additional history: as documented    Reviewed  and updated as needed this visit by clinical staff  Tobacco  Allergies  Meds  Problems  Med Hx  Surg Hx  Fam Hx  Soc Hx          Reviewed and updated as needed this visit by Provider  Tobacco  Allergies  Meds  Problems  Med Hx  Surg Hx  Fam Hx         Patient Active Problem List   Diagnosis     CARDIOVASCULAR SCREENING; LDL GOAL LESS THAN 160     Primary insomnia     Abnormal serum thyroid stimulating hormone (TSH) level     Past Surgical History:   Procedure Laterality Date     COLONOSCOPY N/A 5/20/2015    Procedure: COMBINED COLONOSCOPY, SINGLE OR MULTIPLE BIOPSY/POLYPECTOMY BY BIOPSY;  Surgeon: Duane, William Charles, MD;  Location: [facility]     COLONOSCOPY WITH CO2 INSUFFLATION N/A 5/20/2015    Procedure: COLONOSCOPY WITH CO2 INSUFFLATION;   "Surgeon: Duane, William Charles, MD;  Location: [facility]     ENDOSCOPY UPPER, COLONOSCOPY, COMBINED N/A 5/20/2015    Procedure: COMBINED ENDOSCOPY UPPER, COLONOSCOPY;  Surgeon: Duane, William Charles, MD;  Location: [facility]     ESOPHAGOSCOPY, GASTROSCOPY, DUODENOSCOPY (EGD), COMBINED N/A 5/20/2015    Procedure: COMBINED ESOPHAGOSCOPY, GASTROSCOPY, DUODENOSCOPY (EGD), BIOPSY SINGLE OR MULTIPLE;  Surgeon: Duane, William Charles, MD;  Location: [facility] OR       Social History     Tobacco Use     Smoking status: Never Smoker     Smokeless tobacco: Never Used   Substance Use Topics     Alcohol use: No     Family History   Problem Relation Age of Onset     Cancer Father 50        lung cancer         Current Outpatient Medications   Medication Sig Dispense Refill     sertraline (ZOLOFT) 50 MG tablet Take 1 tablet (50 mg) by mouth daily Start with 25 mg daily for 1 week then increase to 50 mg thereafter 30 tablet 1     zolpidem (AMBIEN) 10 MG tablet Take 1 tablet (10 mg) by mouth nightly as needed for sleep Needs to be seen for any further refill. 30 tablet 5     No Known Allergies    ROS:  Constitutional, HEENT, cardiovascular, pulmonary, gi and gu systems are negative, except as otherwise noted.    OBJECTIVE:     /80   Pulse 77   Temp 98.2  F (36.8  C) (Oral)   Resp 18   Ht 1.755 m (5' 9.09\")   Wt 95.5 kg (210 lb 9.6 oz)   SpO2 98%   BMI 31.02 kg/m    Body mass index is 31.02 kg/m .  GENERAL: healthy, alert and no distress  NECK: no adenopathy, no asymmetry, masses, or scars and thyroid normal to palpation  RESP: lungs clear to auscultation - no rales, rhonchi or wheezes  CV: regular rate and rhythm, normal S1 S2, no S3 or S4, no murmur, click or rub, no peripheral edema and peripheral pulses strong  ABDOMEN: soft, nontender, no hepatosplenomegaly, no masses and bowel sounds normal  MS: no gross musculoskeletal defects noted, no edema  PSYCH: mentation appears normal, affect normal/bright    Diagnostic Test " Results:  Results for orders placed or performed in visit on 04/26/19   TSH with free T4 reflex   Result Value Ref Range    TSH <0.01 (L) 0.40 - 4.00 mU/L   CBC with platelets   Result Value Ref Range    WBC 9.3 4.0 - 11.0 10e9/L    RBC Count 5.61 4.4 - 5.9 10e12/L    Hemoglobin 16.6 13.3 - 17.7 g/dL    Hematocrit 46.1 40.0 - 53.0 %    MCV 82 78 - 100 fl    MCH 29.6 26.5 - 33.0 pg    MCHC 36.0 31.5 - 36.5 g/dL    RDW 12.6 10.0 - 15.0 %    Platelet Count 279 150 - 450 10e9/L   Basic metabolic panel  (Ca, Cl, CO2, Creat, Gluc, K, Na, BUN)   Result Value Ref Range    Sodium 138 133 - 144 mmol/L    Potassium 3.5 3.4 - 5.3 mmol/L    Chloride 105 94 - 109 mmol/L    Carbon Dioxide 24 20 - 32 mmol/L    Anion Gap 9 3 - 14 mmol/L    Glucose 71 70 - 99 mg/dL    Urea Nitrogen 15 7 - 30 mg/dL    Creatinine 0.88 0.66 - 1.25 mg/dL    GFR Estimate >90 >60 mL/min/[1.73_m2]    GFR Estimate If Black >90 >60 mL/min/[1.73_m2]    Calcium 9.0 8.5 - 10.1 mg/dL   T4 free   Result Value Ref Range    T4 Free 1.05 0.76 - 1.46 ng/dL       ASSESSMENT/PLAN:       ICD-10-CM    1. Generalized anxiety disorder F41.1 sertraline (ZOLOFT) 50 MG tablet     T4 free     T4 free   2. Palpitations R00.2 TSH with free T4 reflex     CBC with platelets     Basic metabolic panel  (Ca, Cl, CO2, Creat, Gluc, K, Na, BUN)   3. Abnormal serum thyroid stimulating hormone (TSH) level R79.89 **TSH with free T4 reflex FUTURE anytime     T3, Free     Thyroid peroxidase antibody     Thyroglobulin and antibody       Sertraline  1/2 a tablet daily for 1 week then increase to 1 tablet daily   Follow up in 1 month     2. If symptoms do not improve on Sertraline, patient may need to see cardiology for Halter monitor    3. TSH is very low, T4 is enl. Patient will repeat lab in 3 weeks       Dennise Olegovna Jessie, PA-C  [facility]

## 2019-04-27 ASSESSMENT — ANXIETY QUESTIONNAIRES: GAD7 TOTAL SCORE: 13

## 2019-05-02 PROBLEM — R79.89 ABNORMAL SERUM THYROID STIMULATING HORMONE (TSH) LEVEL: Status: ACTIVE | Noted: 2019-05-02

## 2019-05-10 ENCOUNTER — TELEPHONE (OUTPATIENT)
Dept: FAMILY MEDICINE | Facility: CLINIC | Age: 39
End: 2019-05-10

## 2019-05-10 NOTE — RESULT ENCOUNTER NOTE
Please call the patient with these results:      tsh was slightly abnormal, but T4 was normal. Patient needs to repeat lab again. He may do it at next follow up that is coming up, or may come in for lab only appointment.    Karly Roman PAC

## 2019-05-10 NOTE — TELEPHONE ENCOUNTER
This writer attempted to contact Francis on 05/10/19      Reason for call results and left message.      If patient calls back:   Registered Nurse called. Follow Triage Call workflow        Lady Perrin RN       Notes recorded by Dennise Roman PA-C on 5/10/2019 at 3:33 PM CDT  Please call the patient with these results:      tsh was slightly abnormal, but T4 was normal. Patient needs to repeat lab again. He may do it at next follow up that is coming up, or may come in for lab only appointment.    Karly Roman PAC

## 2019-05-13 NOTE — TELEPHONE ENCOUNTER
Called and spoke with patient. Advised of results and provider plan. Patient understanding and agreed. Patient will wait for follow up apt to recheck thyroid levels. Writer assisted in scheduling patient for Friday, May 24 th at 4:20 pm for recheck on his anxiety and to repeat thyroid labs. No questions at this time.    Kimmy Courtney RN

## 2019-05-24 ENCOUNTER — OFFICE VISIT (OUTPATIENT)
Dept: FAMILY MEDICINE | Facility: CLINIC | Age: 39
End: 2019-05-24
Payer: COMMERCIAL

## 2019-05-24 VITALS
OXYGEN SATURATION: 97 % | RESPIRATION RATE: 18 BRPM | TEMPERATURE: 98.2 F | SYSTOLIC BLOOD PRESSURE: 115 MMHG | BODY MASS INDEX: 31.13 KG/M2 | HEART RATE: 70 BPM | HEIGHT: 69 IN | DIASTOLIC BLOOD PRESSURE: 81 MMHG | WEIGHT: 210.2 LBS

## 2019-05-24 DIAGNOSIS — F41.1 GAD (GENERALIZED ANXIETY DISORDER): Primary | ICD-10-CM

## 2019-05-24 DIAGNOSIS — R94.6 ABNORMAL THYROID FUNCTION TEST: ICD-10-CM

## 2019-05-24 DIAGNOSIS — R00.2 PALPITATIONS: ICD-10-CM

## 2019-05-24 LAB
T3FREE SERPL-MCNC: 2.2 PG/ML (ref 2.3–4.2)
TSH SERPL DL<=0.005 MIU/L-ACNC: 0.95 MU/L (ref 0.4–4)

## 2019-05-24 PROCEDURE — 99214 OFFICE O/P EST MOD 30 MIN: CPT | Performed by: PHYSICIAN ASSISTANT

## 2019-05-24 PROCEDURE — 84443 ASSAY THYROID STIM HORMONE: CPT | Performed by: PHYSICIAN ASSISTANT

## 2019-05-24 PROCEDURE — 36415 COLL VENOUS BLD VENIPUNCTURE: CPT | Performed by: PHYSICIAN ASSISTANT

## 2019-05-24 PROCEDURE — 99000 SPECIMEN HANDLING OFFICE-LAB: CPT | Performed by: PHYSICIAN ASSISTANT

## 2019-05-24 PROCEDURE — 86376 MICROSOMAL ANTIBODY EACH: CPT | Performed by: PHYSICIAN ASSISTANT

## 2019-05-24 PROCEDURE — 86800 THYROGLOBULIN ANTIBODY: CPT | Mod: 90 | Performed by: PHYSICIAN ASSISTANT

## 2019-05-24 PROCEDURE — 84481 FREE ASSAY (FT-3): CPT | Performed by: PHYSICIAN ASSISTANT

## 2019-05-24 PROCEDURE — 84432 ASSAY OF THYROGLOBULIN: CPT | Mod: 90 | Performed by: PHYSICIAN ASSISTANT

## 2019-05-24 ASSESSMENT — ANXIETY QUESTIONNAIRES
2. NOT BEING ABLE TO STOP OR CONTROL WORRYING: SEVERAL DAYS
GAD7 TOTAL SCORE: 6
5. BEING SO RESTLESS THAT IT IS HARD TO SIT STILL: NOT AT ALL
IF YOU CHECKED OFF ANY PROBLEMS ON THIS QUESTIONNAIRE, HOW DIFFICULT HAVE THESE PROBLEMS MADE IT FOR YOU TO DO YOUR WORK, TAKE CARE OF THINGS AT HOME, OR GET ALONG WITH OTHER PEOPLE: SOMEWHAT DIFFICULT
6. BECOMING EASILY ANNOYED OR IRRITABLE: MORE THAN HALF THE DAYS
7. FEELING AFRAID AS IF SOMETHING AWFUL MIGHT HAPPEN: NOT AT ALL
3. WORRYING TOO MUCH ABOUT DIFFERENT THINGS: SEVERAL DAYS
1. FEELING NERVOUS, ANXIOUS, OR ON EDGE: SEVERAL DAYS

## 2019-05-24 ASSESSMENT — PAIN SCALES - GENERAL: PAINLEVEL: NO PAIN (0)

## 2019-05-24 ASSESSMENT — PATIENT HEALTH QUESTIONNAIRE - PHQ9: 5. POOR APPETITE OR OVEREATING: SEVERAL DAYS

## 2019-05-24 ASSESSMENT — MIFFLIN-ST. JEOR: SCORE: 1870.33

## 2019-05-24 NOTE — PATIENT INSTRUCTIONS
Sertraline Increase to 75 mg daily for 2 weeks then may increase to 100 mg daily thereafter    Make appointment with cardiology if continue to feel palpitations

## 2019-05-24 NOTE — PROGRESS NOTES
Subjective     Francis Sommers is a 38 year old male who presents to clinic today for the following health issues:    HPI   Anxiety Follow-Up    How are you doing with your anxiety since your last visit? No change    Are you having other symptoms that might be associated with anxiety? Yes:  Heart isnt racing but still beating hard, hands and feet still feel numb    Have you had a significant life event? No     Are you feeling depressed? No    Do you have any concerns with your use of alcohol or other drugs? No    Social History     Tobacco Use     Smoking status: Never Smoker     Smokeless tobacco: Never Used   Substance Use Topics     Alcohol use: No     Drug use: No     LUCIAN-7 SCORE 3/5/2012 4/26/2019 5/24/2019   Total Score 0 - -   Total Score - 13 6     PHQ 4/26/2019   PHQ-9 Total Score 4   Q9: Thoughts of better off dead/self-harm past 2 weeks Not at all     No flowsheet data found.      Amount of exercise or physical activity: 6-7 days/week for an average of 30-45 minutes    Problems taking medications regularly: No    Medication side effects: none    Diet: regular (no restrictions)          Patient Active Problem List   Diagnosis     CARDIOVASCULAR SCREENING; LDL GOAL LESS THAN 160     Primary insomnia     Abnormal serum thyroid stimulating hormone (TSH) level     LUCIAN (generalized anxiety disorder)     Past Surgical History:   Procedure Laterality Date     COLONOSCOPY N/A 5/20/2015    Procedure: COMBINED COLONOSCOPY, SINGLE OR MULTIPLE BIOPSY/POLYPECTOMY BY BIOPSY;  Surgeon: Duane, William Charles, MD;  Location:  OR     COLONOSCOPY WITH CO2 INSUFFLATION N/A 5/20/2015    Procedure: COLONOSCOPY WITH CO2 INSUFFLATION;  Surgeon: Duane, William Charles, MD;  Location:  OR     ENDOSCOPY UPPER, COLONOSCOPY, COMBINED N/A 5/20/2015    Procedure: COMBINED ENDOSCOPY UPPER, COLONOSCOPY;  Surgeon: Duane, William Charles, MD;  Location:  OR     ESOPHAGOSCOPY, GASTROSCOPY, DUODENOSCOPY (EGD), COMBINED N/A  "5/20/2015    Procedure: COMBINED ESOPHAGOSCOPY, GASTROSCOPY, DUODENOSCOPY (EGD), BIOPSY SINGLE OR MULTIPLE;  Surgeon: Duane, William Charles, MD;  Location:  OR       Social History     Tobacco Use     Smoking status: Never Smoker     Smokeless tobacco: Never Used   Substance Use Topics     Alcohol use: No     Family History   Problem Relation Age of Onset     Cancer Father 50        lung cancer         Current Outpatient Medications   Medication Sig Dispense Refill     sertraline (ZOLOFT) 50 MG tablet Increase to 75 mg daily for 2 weeks then may increase to 100 mg daily thereafter 60 tablet 3     zolpidem (AMBIEN) 10 MG tablet Take 1 tablet (10 mg) by mouth nightly as needed for sleep Needs to be seen for any further refill. 30 tablet 5     No Known Allergies      Reviewed and updated as needed this visit by Provider         Review of Systems   ROS COMP: Constitutional, HEENT, cardiovascular, pulmonary, gi and gu systems are negative, except as otherwise noted.      Objective    /81 (BP Location: Right arm, Patient Position: Sitting, Cuff Size: Adult Large)   Pulse 70   Temp 98.2  F (36.8  C) (Oral)   Resp 18   Ht 1.755 m (5' 9.09\")   Wt 95.3 kg (210 lb 3.2 oz)   SpO2 97%   BMI 30.96 kg/m    Body mass index is 30.96 kg/m .  Physical Exam   GENERAL: healthy, alert and no distress  NECK: no adenopathy, no asymmetry, masses, or scars and thyroid normal to palpation  RESP: lungs clear to auscultation - no rales, rhonchi or wheezes  CV: regular rate and rhythm, normal S1 S2, no S3 or S4, no murmur, click or rub, no peripheral edema and peripheral pulses strong  ABDOMEN: soft, nontender, no hepatosplenomegaly, no masses and bowel sounds normal  MS: no gross musculoskeletal defects noted, no edema  PSYCH: mentation appears normal, affect normal/bright    Diagnostic Test Results:  Results for orders placed or performed in visit on 05/24/19   T3, Free   Result Value Ref Range    Free T3 2.2 (L) 2.3 - 4.2 " "pg/mL   Thyroid peroxidase antibody   Result Value Ref Range    Thyroid Peroxidase Antibody 136 (H) <35 IU/mL   **TSH with free T4 reflex FUTURE anytime   Result Value Ref Range    TSH 0.95 0.40 - 4.00 mU/L           Assessment & Plan       ICD-10-CM    1. LUCIAN (generalized anxiety disorder) F41.1 sertraline (ZOLOFT) 50 MG tablet   2. Palpitations R00.2 CARDIOLOGY EVAL ADULT REFERRAL   3. Abnormal thyroid function test R94.6 T3, Free     Thyroid peroxidase antibody     Thyroglobulin and antibody     1. Improving.  Sertraline Increase to 75 mg daily for 2 weeks then may increase to 100 mg daily thereafter.  Follow up in 6 weeks   2. Possibly related to thyroid  3. Suspect Graves or Hashimotos, TSH and T4 fluctuate from low to high, thyroid peroxidase is high  , please make appointment with endocrinology as soon as possible.   For further eval.   BMI:   Estimated body mass index is 30.96 kg/m  as calculated from the following:    Height as of this encounter: 1.755 m (5' 9.09\").    Weight as of this encounter: 95.3 kg (210 lb 3.2 oz).   Weight management plan: Discussed healthy diet and exercise guidelines            No follow-ups on file.    Dennise Roman PA-C  [facility]      "

## 2019-05-25 ASSESSMENT — ANXIETY QUESTIONNAIRES: GAD7 TOTAL SCORE: 6

## 2019-05-28 LAB — THYROPEROXIDASE AB SERPL-ACNC: 136 IU/ML

## 2019-05-31 PROBLEM — R94.6 ABNORMAL THYROID FUNCTION TEST: Status: ACTIVE | Noted: 2019-05-02

## 2019-05-31 NOTE — RESULT ENCOUNTER NOTE
Please call the patient with these results:      Thyroid function test is abnormal, also thyroid antibody test is high, these results suggest that may have a thyroid disorder that needs treatment. Its possible that your palpitations are caused by this thyroid disorder. Please make appointment with endocrinology at  or [facility] , whichever can get you in sooner.  To schedule call [place] [phone] or  [facility] [phone] .    Karly Roman PAC

## 2019-06-04 DIAGNOSIS — F41.1 GENERALIZED ANXIETY DISORDER: ICD-10-CM

## 2019-06-04 NOTE — TELEPHONE ENCOUNTER
"Requested Prescriptions   Pending Prescriptions Disp Refills     sertraline (ZOLOFT) 50 MG tablet [Pharmacy Med Name: SERTRALINE HCL 50 MG TABLET]      Last Written Prescription Date:  5/24/19  Last Fill Quantity: 60,  # refills: 3   Last Office Visit with [facility], [facility] or [facility] prescribing provider:  5/24/19   Future Office Visit:      30 tablet 1     Sig: PLEASE SEE ATTACHED FOR DETAILED DIRECTIONS       SSRIs Protocol Passed - 6/4/2019 10:51 AM        Passed - Recent (12 mo) or future (30 days) visit within the authorizing provider's specialty     Patient had office visit in the last 12 months or has a visit in the next 30 days with authorizing provider or within the authorizing provider's specialty.  See \"Patient Info\" tab in inbasket, or \"Choose Columns\" in Meds & Orders section of the refill encounter.              Passed - Medication is active on med list        Passed - Patient is age 18 or older              Michael Faarax  [facility]  "

## 2019-06-06 NOTE — TELEPHONE ENCOUNTER
Routing refill request to provider for review/approval because:  Titrated dose  Lady Perrin RN

## 2019-06-07 ENCOUNTER — TELEPHONE (OUTPATIENT)
Dept: FAMILY MEDICINE | Facility: CLINIC | Age: 39
End: 2019-06-07

## 2019-06-07 DIAGNOSIS — F41.1 GAD (GENERALIZED ANXIETY DISORDER): ICD-10-CM

## 2019-06-07 NOTE — TELEPHONE ENCOUNTER
Pharmacy faxing request to have you resend script for sertraline (ZOLOFT) 50 MG tablet with directions.  They do not see them with current script.

## 2019-06-10 LAB — LAB SCANNED RESULT: ABNORMAL

## 2019-06-10 NOTE — TELEPHONE ENCOUNTER
Patient had prescription sent with proper instructions and 3 refills on 5/24/19. Patient needs to call pharmacy for refills not clinic.   Another prescription was sent again.     Karly SALOMON

## 2019-09-13 DIAGNOSIS — F51.01 PRIMARY INSOMNIA: ICD-10-CM

## 2019-09-16 DIAGNOSIS — F51.01 PRIMARY INSOMNIA: ICD-10-CM

## 2019-09-16 RX ORDER — ZOLPIDEM TARTRATE 10 MG/1
TABLET ORAL
Qty: 30 TABLET | Refills: 0 | Status: SHIPPED | OUTPATIENT
Start: 2019-09-16 | End: 2019-10-28

## 2019-09-16 NOTE — TELEPHONE ENCOUNTER
Routing refill request to provider for review/approval because:  Drug not on the [facility] refill protocol       Awa Encinas RN, BSN, PHN

## 2019-09-16 NOTE — TELEPHONE ENCOUNTER
Requested Prescriptions   Pending Prescriptions Disp Refills     zolpidem (AMBIEN) 10 MG tablet [Pharmacy Med Name: ZOLPIDEM TARTRATE 10 MG TABLET] 30 tablet      Sig: TAKE 1 TABLET BY MOUTH NIGHTLY AS NEEDED FOR SLEEP. NEEDS TO BE SEEN FOR ANY FURTHER REFILLS.       There is no refill protocol information for this order          zolpidem (AMBIEN) 10 MG tablet      Last Written Prescription Date:  3/4/19  Last Fill Quantity: 30,   # refills: 5  Last Office Visit: 5/24/19  Future Office visit:       Routing refill request to provider for review/approval because:  Drug not on the [facility], P or [facility] refill protocol or controlled substance

## 2019-10-28 ENCOUNTER — OFFICE VISIT (OUTPATIENT)
Dept: FAMILY MEDICINE | Facility: CLINIC | Age: 39
End: 2019-10-28
Payer: COMMERCIAL

## 2019-10-28 VITALS
HEIGHT: 69 IN | DIASTOLIC BLOOD PRESSURE: 78 MMHG | TEMPERATURE: 97.9 F | BODY MASS INDEX: 31.84 KG/M2 | OXYGEN SATURATION: 98 % | WEIGHT: 215 LBS | HEART RATE: 78 BPM | SYSTOLIC BLOOD PRESSURE: 118 MMHG

## 2019-10-28 DIAGNOSIS — E06.3 HASHIMOTO'S THYROIDITIS: ICD-10-CM

## 2019-10-28 DIAGNOSIS — F41.1 GAD (GENERALIZED ANXIETY DISORDER): ICD-10-CM

## 2019-10-28 DIAGNOSIS — F51.01 PRIMARY INSOMNIA: Primary | ICD-10-CM

## 2019-10-28 PROCEDURE — 84439 ASSAY OF FREE THYROXINE: CPT | Performed by: FAMILY MEDICINE

## 2019-10-28 PROCEDURE — 84480 ASSAY TRIIODOTHYRONINE (T3): CPT | Performed by: FAMILY MEDICINE

## 2019-10-28 PROCEDURE — 99214 OFFICE O/P EST MOD 30 MIN: CPT | Performed by: FAMILY MEDICINE

## 2019-10-28 PROCEDURE — 84443 ASSAY THYROID STIM HORMONE: CPT | Performed by: FAMILY MEDICINE

## 2019-10-28 PROCEDURE — 36415 COLL VENOUS BLD VENIPUNCTURE: CPT | Performed by: FAMILY MEDICINE

## 2019-10-28 RX ORDER — ZOLPIDEM TARTRATE 10 MG/1
TABLET ORAL
Qty: 30 TABLET | Refills: 5 | Status: SHIPPED | OUTPATIENT
Start: 2019-10-28 | End: 2020-04-24

## 2019-10-28 RX ORDER — SERTRALINE HYDROCHLORIDE 100 MG/1
100 TABLET, FILM COATED ORAL DAILY
Qty: 90 TABLET | Refills: 1 | Status: SHIPPED | OUTPATIENT
Start: 2019-10-28 | End: 2020-04-24

## 2019-10-28 ASSESSMENT — MIFFLIN-ST. JEOR: SCORE: 1887.1

## 2019-10-28 NOTE — PROGRESS NOTES
Subjective     Francis Sommers is a 39 year old male who presents to clinic today for the following health issues:    HPI   Anxiety Follow-Up    How are you doing with your anxiety since your last visit? Improved     Are you having other symptoms that might be associated with anxiety? No    Have you had a significant life event? No     Are you feeling depressed? No    Do you have any concerns with your use of alcohol or other drugs? No    Social History     Tobacco Use     Smoking status: Never Smoker     Smokeless tobacco: Never Used   Substance Use Topics     Alcohol use: No     Drug use: No     LUCIAN-7 SCORE 3/5/2012 4/26/2019 5/24/2019   Total Score 0 - -   Total Score - 13 6     PHQ 4/26/2019   PHQ-9 Total Score 4   Q9: Thoughts of better off dead/self-harm past 2 weeks Not at all         Medication Followup of Ambien    Taking Medication as prescribed: yes    Side Effects:  None    Medication Helping Symptoms:  Yes     SUBJECTIVE:  Here today primarily in follow-up of anxiety and insomnia.  Patient known to me though it has been a number of months since I seen him, and interval history is reviewed with the patient and through care everywhere..  Has done great on Ambien at night.  Without it he just cannot sleep at all.  Has tried some over-the-counter agents such as Benadryl and melatonin without much relief.  When we last met with started discussing some palpitations that he was having and eventually he was seen by Dr. Gutierrez at [facility] where lab work confirmed Hashimoto's thyroiditis.  He has remained primarily in a euthyroid state and most recent lab work was done a few months ago.  Due to recheck on this.  During this time he was also started on Zoloft for anxiety and said he is feeling a lot better.  He thinks that has helped with the issue of palpitations as well.  Weight has been stable and no GI symptoms.    Review of systems otherwise negative.  Past medical, family, and social history reviewed and  "updated in chart.    OBJECTIVE:  /78 (BP Location: Right arm, Patient Position: Chair, Cuff Size: Adult Large)   Pulse 78   Temp 97.9  F (36.6  C) (Oral)   Ht 1.755 m (5' 9.09\")   Wt 97.5 kg (215 lb)   SpO2 98%   BMI 31.66 kg/m    Alert, pleasant, upbeat, and in no apparent discomfort.  S1 and S2 normal, no murmurs, clicks, gallops or rubs. Regular rate and rhythm. Chest is clear; no wheezes or rales. No edema or JVD.  Past labs reviewed with the patient.     ASSESSMENT / PLAN:  (F51.01) Primary insomnia  (primary encounter diagnosis)  Comment: Doing well on current dosage.  Also suggested doxylamine as an over-the-counter agent he can use as well  Plan: zolpidem (AMBIEN) 10 MG tablet            (F41.1) LUCIAN (generalized anxiety disorder)  Comment: Stable on current dosage and will continue same  Plan: sertraline (ZOLOFT) 100 MG tablet            (E06.3) Hashimoto's thyroiditis  Comment: Recheck for stability  Plan: TSH with free T4 reflex, T3, total, T4, free            Follow up 6 months or based on results  KHADAR Gutierrez MD    (Chart documentation completed in part with Dragon voice-recognition software.  Even though reviewed some grammatical, spelling, and word errors may remain.)       "

## 2019-10-28 NOTE — LETTER
[address]  [address]  [phone]    October 31, 2019      Francis Sommers  [address]  [facility] [address]          Francis,     Here is a copy of the thyroid labs.  I am concerned with that elevated TSH - may indicate your thyroid is starting to run a little low.  I would suggest contacting Dr. Gutierrez to take a look at these.       KHADAR Gutierrez M.D.

## 2019-10-29 LAB
T3 SERPL-MCNC: 98 NG/DL (ref 60–181)
T4 FREE SERPL-MCNC: 0.89 NG/DL (ref 0.76–1.46)
TSH SERPL DL<=0.005 MIU/L-ACNC: 7.59 MU/L (ref 0.4–4)

## 2019-10-31 NOTE — RESULT ENCOUNTER NOTE
Please mail results and note to patient:    Francis,  Here is a copy of the thyroid labs.  I am concerned with that elevated TSH - may indicate your thyroid is starting to run a little low.  I would suggest contacting Dr. Gutierrez to take a look at these.  KHADAR Gutierrez M.D.

## 2019-12-06 DIAGNOSIS — E06.3 HASHIMOTO'S THYROIDITIS: Primary | ICD-10-CM

## 2019-12-09 DIAGNOSIS — E06.3 HASHIMOTO'S THYROIDITIS: Primary | ICD-10-CM

## 2019-12-09 PROCEDURE — 84439 ASSAY OF FREE THYROXINE: CPT

## 2019-12-09 PROCEDURE — 84443 ASSAY THYROID STIM HORMONE: CPT

## 2019-12-09 PROCEDURE — 36415 COLL VENOUS BLD VENIPUNCTURE: CPT

## 2019-12-09 PROCEDURE — 84480 ASSAY TRIIODOTHYRONINE (T3): CPT

## 2019-12-10 LAB
T3 SERPL-MCNC: 99 NG/DL (ref 60–181)
T4 FREE SERPL-MCNC: 1.02 NG/DL (ref 0.76–1.46)
TSH SERPL DL<=0.005 MIU/L-ACNC: 2.62 MU/L (ref 0.4–4)

## 2019-12-12 ENCOUNTER — TELEPHONE (OUTPATIENT)
Dept: FAMILY MEDICINE | Facility: CLINIC | Age: 39
End: 2019-12-12

## 2019-12-12 NOTE — TELEPHONE ENCOUNTER
Reason for Call:  Request for results:    Name of test or procedure: Thyroid results    Date of test of procedure: 12/9    Location of the test or procedure: [facility]    OK to leave the result message on voice mail or with a family member? YES    Phone number Patient can be reached at:  Cell number on file:    Telephone Information:   Mobile [phone]       Additional comments: any    Call taken on 12/12/2019 at 1:59 PM by Mildred Becerril

## 2019-12-13 NOTE — TELEPHONE ENCOUNTER
TSH improved to 2.6.  Can give him results.  Should be interpreted by his specialist however her medication adjustments

## 2020-01-02 ENCOUNTER — OFFICE VISIT (OUTPATIENT)
Dept: URGENT CARE | Facility: URGENT CARE | Age: 40
End: 2020-01-02

## 2020-01-02 VITALS
DIASTOLIC BLOOD PRESSURE: 87 MMHG | SYSTOLIC BLOOD PRESSURE: 128 MMHG | TEMPERATURE: 98.5 F | HEART RATE: 70 BPM | OXYGEN SATURATION: 99 % | RESPIRATION RATE: 16 BRPM | BODY MASS INDEX: 32.4 KG/M2 | WEIGHT: 220 LBS

## 2020-01-02 DIAGNOSIS — R29.898 WEAKNESS OF EXTREMITY: ICD-10-CM

## 2020-01-02 DIAGNOSIS — R42 DIZZY SPELLS: Primary | ICD-10-CM

## 2020-01-02 DIAGNOSIS — R11.2 NAUSEA AND VOMITING, INTRACTABILITY OF VOMITING NOT SPECIFIED, UNSPECIFIED VOMITING TYPE: ICD-10-CM

## 2020-01-02 DIAGNOSIS — R20.2 PARESTHESIAS: ICD-10-CM

## 2020-01-02 PROCEDURE — 99214 OFFICE O/P EST MOD 30 MIN: CPT | Performed by: PHYSICIAN ASSISTANT

## 2020-01-02 RX ORDER — LEVOTHYROXINE SODIUM 50 UG/1
50 TABLET ORAL
COMMUNITY
Start: 2019-11-14

## 2020-01-02 ASSESSMENT — ENCOUNTER SYMPTOMS
WHEEZING: 0
FATIGUE: 0
FACIAL ASYMMETRY: 0
FEVER: 0
SINUS PRESSURE: 0
RHINORRHEA: 0
WEAKNESS: 1
SINUS PAIN: 0
CHILLS: 0
VOMITING: 1
CHEST TIGHTNESS: 0
LIGHT-HEADEDNESS: 1
PALPITATIONS: 0
NUMBNESS: 1
CARDIOVASCULAR NEGATIVE: 1
PARESTHESIAS: 1
RESPIRATORY NEGATIVE: 1
HEADACHES: 0
DIZZINESS: 1
SPEECH DIFFICULTY: 0
SEIZURES: 0
COUGH: 0
NAUSEA: 1
TREMORS: 0
SHORTNESS OF BREATH: 0
SORE THROAT: 0

## 2020-01-02 ASSESSMENT — PAIN SCALES - GENERAL: PAINLEVEL: NO PAIN (0)

## 2020-01-03 NOTE — PROGRESS NOTES
Subjective   Francis Sommers is a 39 year old male who presents to clinic today for the following health issues:  HPI   Dizziness    Duration: 4-5days    Description   Feeling faint:  YES  Feeling like the surroundings are moving: YES  Loss of consciousness or falls: no     Intensity:  moderate    Accompanying signs and symptoms:   Nausea/vomiting: YES  Palpitations: Reports chest discomfort but no SOB, palpitations, orthopnea, PND or peripheral edema.    Weakness in arms or legs: YES  Vision or speech changes: YES  Ringing in ears (Tinnitus): no, reports plugged ears but no pain or drainage.  Mild URI symptoms.  Hearing loss related to dizziness: no   Other (fevers/chills/sweating/dyspnea): No HA, one sided weakness or slurred speech. No abdominal pain, constipation, diarrhea, bloody or black tarry stools.  No fever, chills or sweats    History (similar episodes/head trauma/previous evaluation/recent bleeding):  Recently dxed with thyroid problems    Precipitating or alleviating factors (new meds/chemicals): None  Worse with activity/head movement: YES    Therapies tried and outcome: mucinex, ibuprofen and rest/fluids with minimal relief    Patient Active Problem List   Diagnosis     CARDIOVASCULAR SCREENING; LDL GOAL LESS THAN 160     Primary insomnia     Abnormal thyroid function test     LUCIAN (generalized anxiety disorder)     Hashimoto's thyroiditis     Past Surgical History:   Procedure Laterality Date     COLONOSCOPY N/A 5/20/2015    Procedure: COMBINED COLONOSCOPY, SINGLE OR MULTIPLE BIOPSY/POLYPECTOMY BY BIOPSY;  Surgeon: Duane, William Charles, MD;  Location:  OR     COLONOSCOPY WITH CO2 INSUFFLATION N/A 5/20/2015    Procedure: COLONOSCOPY WITH CO2 INSUFFLATION;  Surgeon: Duane, William Charles, MD;  Location:  OR     ENDOSCOPY UPPER, COLONOSCOPY, COMBINED N/A 5/20/2015    Procedure: COMBINED ENDOSCOPY UPPER, COLONOSCOPY;  Surgeon: Duane, William Charles, MD;  Location:  OR     ESOPHAGOSCOPY,  GASTROSCOPY, DUODENOSCOPY (EGD), COMBINED N/A 5/20/2015    Procedure: COMBINED ESOPHAGOSCOPY, GASTROSCOPY, DUODENOSCOPY (EGD), BIOPSY SINGLE OR MULTIPLE;  Surgeon: Duane, William Charles, MD;  Location:  OR       Social History     Tobacco Use     Smoking status: Never Smoker     Smokeless tobacco: Never Used   Substance Use Topics     Alcohol use: No     Family History   Problem Relation Age of Onset     Cancer Father 50        lung cancer         Current Outpatient Medications   Medication Sig Dispense Refill     levothyroxine (SYNTHROID/LEVOTHROID) 50 MCG tablet Take 50 mcg by mouth       sertraline (ZOLOFT) 100 MG tablet Take 1 tablet (100 mg) by mouth daily 90 tablet 1     zolpidem (AMBIEN) 10 MG tablet TAKE 1 TABLET BY MOUTH NIGHTLY AS NEEDED FOR SLEEP. NEEDS TO BE SEEN FOR ANY FURTHER REFILLS. 30 tablet 5     No Known Allergies  Reviewed and updated as needed this visit by Provider       Review of Systems   Constitutional: Negative for chills, fatigue and fever.   HENT: Negative for congestion, ear discharge, ear pain, hearing loss, rhinorrhea, sinus pressure, sinus pain and sore throat.    Respiratory: Negative.  Negative for cough, chest tightness, shortness of breath and wheezing.    Cardiovascular: Negative.  Negative for chest pain, palpitations and peripheral edema.   Gastrointestinal: Positive for nausea and vomiting.   Neurological: Positive for dizziness, weakness, light-headedness, numbness and paresthesias. Negative for tremors, seizures, syncope, facial asymmetry, speech difficulty and headaches.   All other systems reviewed and are negative.           Objective    /87 (BP Location: Left arm, Patient Position: Sitting, Cuff Size: Adult Regular)   Pulse 70   Temp 98.5  F (36.9  C) (Oral)   Resp 16   Wt 99.8 kg (220 lb)   SpO2 99%   BMI 32.40 kg/m    Body mass index is 32.4 kg/m .  Physical Exam  Vitals signs and nursing note reviewed.   Constitutional:       General: He is not in  acute distress.     Appearance: Normal appearance. He is not ill-appearing or diaphoretic.   HENT:      Head: Normocephalic and atraumatic.      Ears:      Comments: TMs are intact without any erythema or bulging bilaterally.  Airway is patent.     Nose: Nose normal. No nasal deformity, septal deviation or mucosal edema.      Right Sinus: No maxillary sinus tenderness or frontal sinus tenderness.      Left Sinus: No maxillary sinus tenderness or frontal sinus tenderness.      Mouth/Throat:      Lips: Pink.      Mouth: Mucous membranes are moist.      Pharynx: Oropharynx is clear. Uvula midline. No pharyngeal swelling, oropharyngeal exudate, posterior oropharyngeal erythema or uvula swelling.      Tonsils: No tonsillar exudate or tonsillar abscesses.   Eyes:      General: No scleral icterus.     Conjunctiva/sclera: Conjunctivae normal.      Pupils: Pupils are equal, round, and reactive to light.   Neck:      Musculoskeletal: Full passive range of motion without pain, normal range of motion and neck supple.      Thyroid: No thyromegaly.      Meningeal: Brudzinski's sign and Kernig's sign absent.   Cardiovascular:      Rate and Rhythm: Normal rate and regular rhythm.      Pulses: Normal pulses.      Heart sounds: Normal heart sounds, S1 normal and S2 normal. No murmur. No friction rub. No gallop.    Pulmonary:      Effort: Pulmonary effort is normal. No tachypnea, accessory muscle usage, respiratory distress or retractions.      Breath sounds: Normal breath sounds and air entry. No stridor. No decreased breath sounds, wheezing, rhonchi or rales.   Abdominal:      General: Bowel sounds are normal.      Palpations: Abdomen is soft. There is no mass.      Tenderness: There is no abdominal tenderness. There is no guarding or rebound.   Lymphadenopathy:      Cervical: No cervical adenopathy.   Skin:     General: Skin is warm and dry.      Findings: No rash.   Neurological:      General: No focal deficit present.      Mental  Status: He is alert and oriented to person, place, and time.      GCS: GCS eye subscore is 4. GCS verbal subscore is 5. GCS motor subscore is 6.      Cranial Nerves: Cranial nerves are intact. No cranial nerve deficit.      Sensory: Sensation is intact.      Motor: Motor function is intact.      Coordination: Coordination is intact. Coordination normal.      Gait: Gait is intact.      Deep Tendon Reflexes: Reflexes are normal and symmetric.   Psychiatric:         Mood and Affect: Mood normal.         Behavior: Behavior normal.         Thought Content: Thought content normal.         Judgment: Judgment normal.              Assessment & Plan   Dizzy spells:  Along with paresthesias, weakness of the extremities and n/v.  ?vertigo vs ETD dysfunction vs intracranial pathology vs cardiac.  Due to the severity of his symptoms, recommend further evaluation and management in the ER.  Will most likely need further workup with labs and/or imaging.  Patient has declined transportation via ambulance and will have family drive him.  Understands risks and benefits of ambulance transfer and he has declined.  Call 911 if worsening symptoms.  He plans to go to [facility].  He left in stable condition with AVS in hand.  F/u with PCP after ER visit.     Paresthesias    Weakness of extremity    Nausea and vomiting, intractability of vomiting not specified, unspecified vomiting type        Donna See RAHAT Solitario  [facility]

## 2020-02-14 ENCOUNTER — NURSE TRIAGE (OUTPATIENT)
Dept: FAMILY MEDICINE | Facility: CLINIC | Age: 40
End: 2020-02-14

## 2020-02-14 NOTE — TELEPHONE ENCOUNTER
S-(situation): patient is calling for 8-9 out of 10 chest pain, to his back, SOB. Patient is yelling because it is so painful.    B-(background): NA    A-(assessment): needs immediate care    R-(recommendations): pull over as wife is driving him to the ER and call 911. Patient put wife on speaker phone. Unsure if she will call 911 as she states she is only 10 minutes from hospital. Krista Keenan   Informed to be safe while driving.    Ignacia Hernadez RN, [facility] Triage

## 2020-02-24 ENCOUNTER — OFFICE VISIT (OUTPATIENT)
Dept: FAMILY MEDICINE | Facility: CLINIC | Age: 40
End: 2020-02-24
Payer: COMMERCIAL

## 2020-02-24 VITALS
TEMPERATURE: 98 F | WEIGHT: 224 LBS | RESPIRATION RATE: 18 BRPM | SYSTOLIC BLOOD PRESSURE: 120 MMHG | DIASTOLIC BLOOD PRESSURE: 79 MMHG | OXYGEN SATURATION: 98 % | HEART RATE: 77 BPM | BODY MASS INDEX: 32.99 KG/M2

## 2020-02-24 DIAGNOSIS — R07.89 ATYPICAL CHEST PAIN: ICD-10-CM

## 2020-02-24 DIAGNOSIS — E06.3 HASHIMOTO'S THYROIDITIS: ICD-10-CM

## 2020-02-24 DIAGNOSIS — R00.2 PALPITATIONS: Primary | ICD-10-CM

## 2020-02-24 PROCEDURE — 99214 OFFICE O/P EST MOD 30 MIN: CPT | Performed by: FAMILY MEDICINE

## 2020-02-24 ASSESSMENT — PAIN SCALES - GENERAL: PAINLEVEL: NO PAIN (0)

## 2020-02-24 NOTE — PROGRESS NOTES
Subjective     Francis Sommers is a 39 year old male who presents to clinic today for the following health issues:    HPI   CHEST PAIN     Onset:  A year and a half  ago  -Was diagnosed with a thyroid disease  -intermittent pain  -heart palpitations every day at random times    SUBJECTIVE:  Here today in follow-up of recent ER visit for some chest pain and palpitations.  Patient has brought a palpitations in the past and they have worsened to the point that they are occurring almost every day.  He feels as though his heart ranges from fast to slow and frequently skips beats.  I have not heard any abnormal pattern on auscultation and he has had a few EKGs including one at the ER recently that have been essentially normal.  But he always feels as though there is a heaviness or pain to his upper chest.  Sometimes this is associated with mild shortness of breath but he is not sure what the relationship is between those symptoms and the irregularity in his heartbeat.  Records reviewed with the patient and through care everywhere.  ER visit was unremarkable.  Under treatment for now hypothyroidism subsequent to likely Hashimoto's thyroiditis.  Saw endocrinology a few days ago and they felt that he was in balance enough to back down to 50 mcg daily.  They do not feel that the thyroid is the source of his symptomatology.  It was suggested that he consider stress test at the ER and that is what he is here to discuss today.    Review of systems otherwise negative.  Past medical, family, and social history reviewed and updated in chart.    OBJECTIVE:  /79 (BP Location: Right arm, Patient Position: Sitting, Cuff Size: Adult Large)   Pulse 77   Temp 98  F (36.7  C) (Oral)   Resp 18   Wt 101.6 kg (224 lb)   SpO2 98%   BMI 32.99 kg/m    Alert, pleasant, upbeat, and in no apparent discomfort.  S1 and S2 normal, no murmurs, clicks, gallops or rubs. Regular rate and rhythm. Chest is clear; no wheezes or rales. No  edema or JVD.  Past labs reviewed with the patient.   Reviewed outside lab work including EKG    ASSESSMENT / PLAN:  (R00.2) Palpitations  (primary encounter diagnosis)  Comment: Discussed the situation with the patient and I think we should be able to pick this up in a couple of days since he is having symptoms almost every day  Plan: Zio Patch Holter 48 Hour Adult Pediatric            (R07.89) Atypical chest pain  Comment: Would also like to set up an exercise stress echocardiogram for evaluation.  Would like him to do the ZIO patch first  Plan: Echocardiogram Exercise Stress            (E06.3) Hashimoto's thyroiditis  Comment: Stable and following with endocrinology as well  Plan:     Follow up 2 weeks based upon results  SYajaira Gutierrez MD    (Chart documentation completed in part with Dragon voice-recognition software.  Even though reviewed some grammatical, spelling, and word errors may remain.)

## 2020-03-06 ENCOUNTER — ANCILLARY PROCEDURE (OUTPATIENT)
Dept: CARDIOLOGY | Facility: CLINIC | Age: 40
End: 2020-03-06
Attending: FAMILY MEDICINE
Payer: COMMERCIAL

## 2020-03-06 DIAGNOSIS — R00.2 PALPITATIONS: ICD-10-CM

## 2020-03-06 DIAGNOSIS — R07.89 ATYPICAL CHEST PAIN: ICD-10-CM

## 2020-03-06 PROCEDURE — 93325 DOPPLER ECHO COLOR FLOW MAPG: CPT | Performed by: INTERNAL MEDICINE

## 2020-03-06 PROCEDURE — 93016 CV STRESS TEST SUPVJ ONLY: CPT | Performed by: INTERNAL MEDICINE

## 2020-03-06 PROCEDURE — 93017 CV STRESS TEST TRACING ONLY: CPT | Performed by: INTERNAL MEDICINE

## 2020-03-06 PROCEDURE — 93227 XTRNL ECG REC<48 HR R&I: CPT | Performed by: INTERNAL MEDICINE

## 2020-03-06 PROCEDURE — 93018 CV STRESS TEST I&R ONLY: CPT | Performed by: INTERNAL MEDICINE

## 2020-03-06 PROCEDURE — 93321 DOPPLER ECHO F-UP/LMTD STD: CPT | Performed by: INTERNAL MEDICINE

## 2020-03-06 PROCEDURE — 93225 XTRNL ECG REC<48 HRS REC: CPT | Performed by: INTERNAL MEDICINE

## 2020-03-06 PROCEDURE — 93352 ADMIN ECG CONTRAST AGENT: CPT | Performed by: INTERNAL MEDICINE

## 2020-03-06 PROCEDURE — 93350 STRESS TTE ONLY: CPT | Performed by: INTERNAL MEDICINE

## 2020-03-06 NOTE — PATIENT INSTRUCTIONS
Patient has been prescribed a ZioPatch holter for 2 days.  Patient was instructed regarding the indication, function, care and prompt return of the ZioPatch holter monitor. The monitor, with S/N [id number],  was placed on the patient with instructions regarding care of the skin, electrodes, and monitor, as well as documentation in the patient diary. Patient demonstrated understanding of this information and agreed to call [employer] with further questions or concerns.

## 2020-03-08 NOTE — RESULT ENCOUNTER NOTE
Francis,  I do not yet have the results of the Zio patch - for some reason that often takes a couple of weeks.  But the stress test was normal - so very good news there.  KHADAR Gutierrez M.D.

## 2020-04-24 ENCOUNTER — MYC REFILL (OUTPATIENT)
Dept: FAMILY MEDICINE | Facility: CLINIC | Age: 40
End: 2020-04-24

## 2020-04-24 DIAGNOSIS — F51.01 PRIMARY INSOMNIA: ICD-10-CM

## 2020-04-24 DIAGNOSIS — F41.1 GAD (GENERALIZED ANXIETY DISORDER): ICD-10-CM

## 2020-04-24 RX ORDER — SERTRALINE HYDROCHLORIDE 100 MG/1
100 TABLET, FILM COATED ORAL DAILY
Qty: 90 TABLET | Refills: 0 | Status: SHIPPED | OUTPATIENT
Start: 2020-04-24 | End: 2020-07-17

## 2020-04-24 RX ORDER — ZOLPIDEM TARTRATE 10 MG/1
TABLET ORAL
Qty: 30 TABLET | Refills: 2 | Status: SHIPPED | OUTPATIENT
Start: 2020-04-24 | End: 2020-04-24

## 2020-04-24 RX ORDER — ZOLPIDEM TARTRATE 10 MG/1
TABLET ORAL
Qty: 30 TABLET | Refills: 5 | OUTPATIENT
Start: 2020-04-24

## 2020-04-24 RX ORDER — ZOLPIDEM TARTRATE 10 MG/1
TABLET ORAL
Qty: 30 TABLET | Refills: 2 | Status: SHIPPED | OUTPATIENT
Start: 2020-04-24 | End: 2020-07-17

## 2020-04-24 NOTE — TELEPHONE ENCOUNTER
Requested Prescriptions   Pending Prescriptions Disp Refills     zolpidem (AMBIEN) 10 MG tablet [Pharmacy Med Name: Zolpidem Tartrate Oral Tablet 10 MG] 30 tablet 0     Sig: TAKE ONE TABLET BY MOUTH NIGHTLY AS NEEDED FOR SLEEP       There is no refill protocol information for this order        Routing refill request to provider for review/approval because:  [place] [facility] [place]         Awa Encinas RN, BSN, PHN

## 2020-07-15 ENCOUNTER — TELEPHONE (OUTPATIENT)
Dept: FAMILY MEDICINE | Facility: CLINIC | Age: 40
End: 2020-07-15

## 2020-07-15 DIAGNOSIS — F51.01 PRIMARY INSOMNIA: ICD-10-CM

## 2020-07-15 DIAGNOSIS — F41.1 GAD (GENERALIZED ANXIETY DISORDER): ICD-10-CM

## 2020-07-15 NOTE — TELEPHONE ENCOUNTER
Reason for Call:  Medication or medication refill:    Do you use a [facility]?  Name of the pharmacy and phone number for the current request:  [id number] - [place], [place] - [address]    Name of the medication requested: sertraline (ZOLOFT) 100 MG tablet    Other request: zolpidem (AMBIEN) 10 MG tablet    Can we leave a detailed message on this number? YES    Phone number patient can be reached at: Home number on file [phone] (home)    Best Time: anytime    Call taken on 7/15/2020 at 12:40 PM by Randy Farrar

## 2020-07-17 RX ORDER — SERTRALINE HYDROCHLORIDE 100 MG/1
100 TABLET, FILM COATED ORAL DAILY
Qty: 90 TABLET | Refills: 1 | Status: SHIPPED | OUTPATIENT
Start: 2020-07-17

## 2020-07-17 RX ORDER — ZOLPIDEM TARTRATE 10 MG/1
TABLET ORAL
Qty: 30 TABLET | Refills: 0 | Status: SHIPPED | OUTPATIENT
Start: 2020-07-17

## 2020-07-17 NOTE — TELEPHONE ENCOUNTER
ambien refilled for one month- assist with scheduling virtual visit with Dr. Gutierrez for anxiety and insomnia prior to additional refills

## 2020-07-17 NOTE — TELEPHONE ENCOUNTER
"For zolpidem:  Routing refill request to provider for review/approval because:  Drug not on the [facility] refill protocol       For sertraline:  Prescription approved per [facility] Refill Protocol.      Requested Prescriptions   Pending Prescriptions Disp Refills     zolpidem (AMBIEN) 10 MG tablet 30 tablet 2     Sig: TAKE ONE TABLET BY MOUTH NIGHTLY AS NEEDED FOR SLEEP       There is no refill protocol information for this order        sertraline (ZOLOFT) 100 MG tablet 90 tablet 0     Sig: Take 1 tablet (100 mg) by mouth daily       [facility] Protocol Passed - 7/15/2020 12:42 PM        Passed - Recent (12 mo) or future (30 days) visit within the authorizing provider's specialty     Patient has had an office visit with the authorizing provider or a provider within the authorizing providers department within the previous 12 mos or has a future within next 30 days. See \"Patient Info\" tab in inbasket, or \"Choose Columns\" in Meds & Orders section of the refill encounter.              Passed - Medication is active on med list        Passed - Patient is age 18 or older                 Awa Encinas RN, BSN, PHN    "

## 2020-08-03 NOTE — TELEPHONE ENCOUNTER
Chief Complaint   Patient presents with     New Patient     new - referral from PCP [facility] 7-30-20     Vitals were taken and medications were reconciled. EKG was performed.    Carol Encinas  2:46 PM     Medication is being filled for 1 time refill only due to:  Patient needs to be seen because due for office visit.   Kay Tomlinson RN

## 2020-11-22 ENCOUNTER — HEALTH MAINTENANCE LETTER (OUTPATIENT)
Age: 40
End: 2020-11-22

## 2021-09-19 ENCOUNTER — HEALTH MAINTENANCE LETTER (OUTPATIENT)
Age: 41
End: 2021-09-19

## 2022-01-09 ENCOUNTER — HEALTH MAINTENANCE LETTER (OUTPATIENT)
Age: 42
End: 2022-01-09

## 2022-11-20 ENCOUNTER — HEALTH MAINTENANCE LETTER (OUTPATIENT)
Age: 42
End: 2022-11-20

## 2023-04-15 ENCOUNTER — HEALTH MAINTENANCE LETTER (OUTPATIENT)
Age: 43
End: 2023-04-15